# Patient Record
Sex: FEMALE | Race: WHITE | NOT HISPANIC OR LATINO | Employment: OTHER | URBAN - NONMETROPOLITAN AREA
[De-identification: names, ages, dates, MRNs, and addresses within clinical notes are randomized per-mention and may not be internally consistent; named-entity substitution may affect disease eponyms.]

---

## 2021-01-11 ENCOUNTER — IMMUNIZATION (OUTPATIENT)
Dept: VACCINE CLINIC | Facility: HOSPITAL | Age: 76
End: 2021-01-11

## 2021-01-11 PROCEDURE — 0001A: CPT | Performed by: FAMILY MEDICINE

## 2021-01-11 PROCEDURE — 91300 HC SARSCOV02 VAC 30MCG/0.3ML IM: CPT | Performed by: FAMILY MEDICINE

## 2021-02-01 ENCOUNTER — IMMUNIZATION (OUTPATIENT)
Dept: VACCINE CLINIC | Facility: HOSPITAL | Age: 76
End: 2021-02-01

## 2021-02-01 PROCEDURE — 0002A: CPT | Performed by: FAMILY MEDICINE

## 2021-02-01 PROCEDURE — 91300 HC SARSCOV02 VAC 30MCG/0.3ML IM: CPT | Performed by: FAMILY MEDICINE

## 2023-09-17 ENCOUNTER — APPOINTMENT (OUTPATIENT)
Dept: CT IMAGING | Facility: HOSPITAL | Age: 78
End: 2023-09-17
Payer: MEDICARE

## 2023-09-17 ENCOUNTER — HOSPITAL ENCOUNTER (EMERGENCY)
Facility: HOSPITAL | Age: 78
Discharge: HOME | End: 2023-09-17
Attending: EMERGENCY MEDICINE | Admitting: EMERGENCY MEDICINE
Payer: MEDICARE

## 2023-09-17 VITALS
RESPIRATION RATE: 18 BRPM | WEIGHT: 132 LBS | TEMPERATURE: 97.3 F | BODY MASS INDEX: 23.39 KG/M2 | HEART RATE: 72 BPM | HEIGHT: 63 IN | DIASTOLIC BLOOD PRESSURE: 72 MMHG | SYSTOLIC BLOOD PRESSURE: 160 MMHG | OXYGEN SATURATION: 91 %

## 2023-09-17 DIAGNOSIS — R91.1 NODULE OF LOWER LOBE OF RIGHT LUNG: ICD-10-CM

## 2023-09-17 DIAGNOSIS — S32.010A CLOSED COMPRESSION FRACTURE OF BODY OF L1 VERTEBRA: ICD-10-CM

## 2023-09-17 DIAGNOSIS — R10.10 UPPER ABDOMINAL PAIN: Primary | ICD-10-CM

## 2023-09-17 LAB
ALBUMIN SERPL-MCNC: 4.1 G/DL (ref 3.5–5.2)
ALBUMIN/GLOB SERPL: 1.2 G/DL
ALP SERPL-CCNC: 134 U/L (ref 39–117)
ALT SERPL W P-5'-P-CCNC: 16 U/L (ref 1–33)
ANION GAP SERPL CALCULATED.3IONS-SCNC: 13 MMOL/L (ref 5–15)
AST SERPL-CCNC: 19 U/L (ref 1–32)
BACTERIA UR QL AUTO: ABNORMAL /HPF
BASOPHILS # BLD AUTO: 0.06 10*3/MM3 (ref 0–0.2)
BASOPHILS NFR BLD AUTO: 0.6 % (ref 0–1.5)
BILIRUB SERPL-MCNC: 0.3 MG/DL (ref 0–1.2)
BILIRUB UR QL STRIP: NEGATIVE
BUN BLDA-MCNC: 18 MG/DL (ref 8–26)
BUN SERPL-MCNC: 18 MG/DL (ref 8–23)
BUN/CREAT SERPL: 18.4 (ref 7–25)
CA-I BLDA-SCNC: 1.24 MMOL/L (ref 1.2–1.32)
CALCIUM SPEC-SCNC: 9.6 MG/DL (ref 8.6–10.5)
CHLORIDE BLDA-SCNC: 105 MMOL/L (ref 98–109)
CHLORIDE SERPL-SCNC: 102 MMOL/L (ref 98–107)
CLARITY UR: CLEAR
CO2 BLDA-SCNC: 24 MMOL/L (ref 24–29)
CO2 SERPL-SCNC: 24 MMOL/L (ref 22–29)
COLOR UR: YELLOW
CREAT BLDA-MCNC: 0.9 MG/DL (ref 0.6–1.3)
CREAT SERPL-MCNC: 0.98 MG/DL (ref 0.57–1)
D-LACTATE SERPL-SCNC: 1.4 MMOL/L (ref 0.5–2)
DEPRECATED RDW RBC AUTO: 52 FL (ref 37–54)
EGFRCR SERPLBLD CKD-EPI 2021: 58.8 ML/MIN/1.73
EGFRCR SERPLBLD CKD-EPI 2021: 65.2 ML/MIN/1.73
EOSINOPHIL # BLD AUTO: 0.25 10*3/MM3 (ref 0–0.4)
EOSINOPHIL NFR BLD AUTO: 2.6 % (ref 0.3–6.2)
ERYTHROCYTE [DISTWIDTH] IN BLOOD BY AUTOMATED COUNT: 15.2 % (ref 12.3–15.4)
GLOBULIN UR ELPH-MCNC: 3.3 GM/DL
GLUCOSE BLDC GLUCOMTR-MCNC: 89 MG/DL (ref 70–130)
GLUCOSE SERPL-MCNC: 109 MG/DL (ref 65–99)
GLUCOSE UR STRIP-MCNC: NEGATIVE MG/DL
HCT VFR BLD AUTO: 36.1 % (ref 34–46.6)
HCT VFR BLDA CALC: 35 % (ref 38–51)
HGB BLD-MCNC: 11.4 G/DL (ref 12–15.9)
HGB BLDA-MCNC: 11.9 G/DL (ref 12–17)
HGB UR QL STRIP.AUTO: ABNORMAL
HOLD SPECIMEN: NORMAL
HYALINE CASTS UR QL AUTO: ABNORMAL /LPF
IMM GRANULOCYTES # BLD AUTO: 0.04 10*3/MM3 (ref 0–0.05)
IMM GRANULOCYTES NFR BLD AUTO: 0.4 % (ref 0–0.5)
KETONES UR QL STRIP: NEGATIVE
LEUKOCYTE ESTERASE UR QL STRIP.AUTO: ABNORMAL
LIPASE SERPL-CCNC: 52 U/L (ref 13–60)
LYMPHOCYTES # BLD AUTO: 1.88 10*3/MM3 (ref 0.7–3.1)
LYMPHOCYTES NFR BLD AUTO: 19.8 % (ref 19.6–45.3)
MCH RBC QN AUTO: 29 PG (ref 26.6–33)
MCHC RBC AUTO-ENTMCNC: 31.6 G/DL (ref 31.5–35.7)
MCV RBC AUTO: 91.9 FL (ref 79–97)
MONOCYTES # BLD AUTO: 0.7 10*3/MM3 (ref 0.1–0.9)
MONOCYTES NFR BLD AUTO: 7.4 % (ref 5–12)
NEUTROPHILS NFR BLD AUTO: 6.56 10*3/MM3 (ref 1.7–7)
NEUTROPHILS NFR BLD AUTO: 69.2 % (ref 42.7–76)
NITRITE UR QL STRIP: NEGATIVE
NRBC BLD AUTO-RTO: 0 /100 WBC (ref 0–0.2)
PH UR STRIP.AUTO: 5.5 [PH] (ref 5–8)
PLATELET # BLD AUTO: 497 10*3/MM3 (ref 140–450)
PMV BLD AUTO: 8.9 FL (ref 6–12)
POTASSIUM BLDA-SCNC: 4 MMOL/L (ref 3.5–4.9)
POTASSIUM SERPL-SCNC: 4.1 MMOL/L (ref 3.5–5.2)
PROT SERPL-MCNC: 7.4 G/DL (ref 6–8.5)
PROT UR QL STRIP: ABNORMAL
RBC # BLD AUTO: 3.93 10*6/MM3 (ref 3.77–5.28)
RBC # UR STRIP: ABNORMAL /HPF
REF LAB TEST METHOD: ABNORMAL
SODIUM BLD-SCNC: 139 MMOL/L (ref 138–146)
SODIUM SERPL-SCNC: 139 MMOL/L (ref 136–145)
SP GR UR STRIP: 1.02 (ref 1–1.03)
SQUAMOUS #/AREA URNS HPF: ABNORMAL /HPF
UROBILINOGEN UR QL STRIP: ABNORMAL
WBC # UR STRIP: ABNORMAL /HPF
WBC NRBC COR # BLD: 9.49 10*3/MM3 (ref 3.4–10.8)
WHOLE BLOOD HOLD COAG: NORMAL
WHOLE BLOOD HOLD SPECIMEN: NORMAL

## 2023-09-17 PROCEDURE — 81001 URINALYSIS AUTO W/SCOPE: CPT | Performed by: EMERGENCY MEDICINE

## 2023-09-17 PROCEDURE — 85025 COMPLETE CBC W/AUTO DIFF WBC: CPT | Performed by: EMERGENCY MEDICINE

## 2023-09-17 PROCEDURE — 99285 EMERGENCY DEPT VISIT HI MDM: CPT

## 2023-09-17 PROCEDURE — 80053 COMPREHEN METABOLIC PANEL: CPT | Performed by: EMERGENCY MEDICINE

## 2023-09-17 PROCEDURE — 83605 ASSAY OF LACTIC ACID: CPT | Performed by: EMERGENCY MEDICINE

## 2023-09-17 PROCEDURE — 83690 ASSAY OF LIPASE: CPT | Performed by: EMERGENCY MEDICINE

## 2023-09-17 PROCEDURE — 96375 TX/PRO/DX INJ NEW DRUG ADDON: CPT

## 2023-09-17 PROCEDURE — 85014 HEMATOCRIT: CPT

## 2023-09-17 PROCEDURE — 25010000002 ONDANSETRON PER 1 MG: Performed by: PHYSICIAN ASSISTANT

## 2023-09-17 PROCEDURE — 74177 CT ABD & PELVIS W/CONTRAST: CPT

## 2023-09-17 PROCEDURE — 25510000001 IOPAMIDOL PER 1 ML: Performed by: EMERGENCY MEDICINE

## 2023-09-17 PROCEDURE — 80047 BASIC METABLC PNL IONIZED CA: CPT

## 2023-09-17 PROCEDURE — 96374 THER/PROPH/DIAG INJ IV PUSH: CPT

## 2023-09-17 PROCEDURE — 71275 CT ANGIOGRAPHY CHEST: CPT

## 2023-09-17 PROCEDURE — 25010000002 HYDROMORPHONE PER 4 MG: Performed by: EMERGENCY MEDICINE

## 2023-09-17 RX ORDER — IBUPROFEN 800 MG/1
1 TABLET ORAL
COMMUNITY
Start: 2023-09-12 | End: 2023-09-21 | Stop reason: HOSPADM

## 2023-09-17 RX ORDER — SODIUM CHLORIDE 9 MG/ML
10 INJECTION INTRAVENOUS AS NEEDED
Status: DISCONTINUED | OUTPATIENT
Start: 2023-09-17 | End: 2023-09-17 | Stop reason: HOSPADM

## 2023-09-17 RX ORDER — ONDANSETRON 2 MG/ML
4 INJECTION INTRAMUSCULAR; INTRAVENOUS ONCE
Status: COMPLETED | OUTPATIENT
Start: 2023-09-17 | End: 2023-09-17

## 2023-09-17 RX ORDER — VENLAFAXINE HYDROCHLORIDE 150 MG/1
1 CAPSULE, EXTENDED RELEASE ORAL EVERY 24 HOURS
COMMUNITY
Start: 2023-08-02

## 2023-09-17 RX ORDER — PANTOPRAZOLE SODIUM 40 MG/1
40 TABLET, DELAYED RELEASE ORAL DAILY
Qty: 14 TABLET | Refills: 0 | Status: SHIPPED | OUTPATIENT
Start: 2023-09-17 | End: 2023-09-18 | Stop reason: SDUPTHER

## 2023-09-17 RX ORDER — ONDANSETRON 4 MG/1
4 TABLET, ORALLY DISINTEGRATING ORAL EVERY 6 HOURS PRN
Qty: 12 TABLET | Refills: 0 | Status: SHIPPED | OUTPATIENT
Start: 2023-09-17

## 2023-09-17 RX ORDER — HYDROCODONE BITARTRATE AND ACETAMINOPHEN 5; 325 MG/1; MG/1
1 TABLET ORAL EVERY 6 HOURS PRN
Qty: 10 TABLET | Refills: 0 | Status: SHIPPED | OUTPATIENT
Start: 2023-09-17

## 2023-09-17 RX ORDER — HYDROMORPHONE HYDROCHLORIDE 1 MG/ML
0.25 INJECTION, SOLUTION INTRAMUSCULAR; INTRAVENOUS; SUBCUTANEOUS ONCE
Status: COMPLETED | OUTPATIENT
Start: 2023-09-17 | End: 2023-09-17

## 2023-09-17 RX ADMIN — IOPAMIDOL 85 ML: 755 INJECTION, SOLUTION INTRAVENOUS at 08:40

## 2023-09-17 RX ADMIN — ONDANSETRON 4 MG: 2 INJECTION INTRAMUSCULAR; INTRAVENOUS at 08:18

## 2023-09-17 RX ADMIN — HYDROMORPHONE HYDROCHLORIDE 0.25 MG: 1 INJECTION, SOLUTION INTRAMUSCULAR; INTRAVENOUS; SUBCUTANEOUS at 08:18

## 2023-09-17 NOTE — ED PROVIDER NOTES
Subjective   History of Present Illness  Ms. Avalos is a generally healthy 77-year-old female (history of depression and hyperlipidemia only), who presents to the emergency department with right lower abdominal and right flank pain for at least a month with radiation of the pain into the back and into the left shoulder blade.  The patient had a recent episode of nausea and vomiting 2 days ago.  She states she has had normal bowel movements.  Normal urination.  No fever or chills.  The patient states that she was seen at [facility] in [place] last month for the same complaints and had a CT of the abdomen pelvis that showed a small right-sided kidney stone.  Her symptoms persist.  The pain is somewhat worse with movement.  She denies any chest pain or shortness of breath.  No cough.  No prior abdominal surgeries.    Review of Systems   Constitutional:  Negative for chills and fever.   HENT:  Negative for sore throat.    Respiratory:  Negative for cough and shortness of breath.    Cardiovascular:  Negative for chest pain.   Gastrointestinal:  Positive for abdominal pain (Right lower). Negative for blood in stool, constipation and diarrhea.        Nausea and vomiting 2 days ago but now resolved.   Genitourinary:  Negative for dysuria.   Musculoskeletal:  Positive for back pain (Low back pain with radiation under the left scapular region.).   Skin:  Negative for rash.   Neurological:  Negative for numbness and headaches.   Hematological: Negative.    Psychiatric/Behavioral: Negative.       Past Medical History:   Diagnosis Date    Depression     Hyperlipidemia        No Known Allergies    History reviewed. No pertinent surgical history.    History reviewed. No pertinent family history.    Social History     Socioeconomic History    Marital status:    Tobacco Use    Smoking status: Every Day     Types: Cigarettes   Substance and Sexual Activity    Alcohol use: Never    Drug use: Never    Sexual activity:  Defer           Objective   Physical Exam  Constitutional:       General: She is not in acute distress.     Appearance: Normal appearance. She is not ill-appearing or diaphoretic.   HENT:      Head: Normocephalic and atraumatic.      Nose: Nose normal.      Mouth/Throat:      Mouth: Mucous membranes are moist.   Eyes:      General: No scleral icterus.     Extraocular Movements: Extraocular movements intact.      Conjunctiva/sclera: Conjunctivae normal.      Pupils: Pupils are equal, round, and reactive to light.   Cardiovascular:      Rate and Rhythm: Normal rate and regular rhythm.      Pulses: Normal pulses.      Heart sounds: Normal heart sounds.      Comments: Normal pedal pulses and radial pulses bilaterally.  Pulmonary:      Effort: Pulmonary effort is normal.      Breath sounds: Normal breath sounds.   Abdominal:      Tenderness: There is abdominal tenderness (Moderate tenderness on palpation over the right lower abdomen (just inferior to McBurney's point).  No guarding or rebound.).   Musculoskeletal:         General: No tenderness.      Cervical back: Normal range of motion and neck supple.      Right lower leg: No edema.      Left lower leg: No edema.   Skin:     General: Skin is warm and dry.   Neurological:      General: No focal deficit present.      Mental Status: She is alert and oriented to person, place, and time.   Psychiatric:         Mood and Affect: Mood normal.       Procedures           ED Course      In summary, 77-year-old female presents with a 1 month history of right lower quadrant abdominal pain and right flank pain.  The pain radiates into the lower back and up under the left shoulder blade.  She had an episode of nausea and vomiting 2 days ago but that has now resolved.  She continues to have pain and notes that it is some worse with movement.  Patient was seen at [facility] last month and had a CT scan of the abdomen pelvis that showed a small right kidney stone.    MDM:  Differential includes urinary tract infection, kidney stone, appendicitis, musculoskeletal pain, aneurysmal pain, radiculopathy, etc.    Old records reviewed.  Urinalysis and labs obtained.  Patient be sent for CT abdomen pelvis with IV contrast.  Given that the pain radiates into the left subscapular region, I will also get CT chest with contrast.  Patient will be given small dose of Dilaudid 0.25 mg IV and a dose of Zofran 4 mg IV.    White count is normal at 9.49.  No significant anemia.    Glucose is 109.  Normal chemistries.  Normal LFTs and creatinine.    UA shows 7-12 RBC, 13-20 WBC and 2+bacteria.  Unfortunately, there are also 13-20 epithelial cells suggesting contamination.  She has no urinary symptoms.  Will culture urine but not treat at this time.    CTA chest and CT abd/pelvis:  1. No evidence of pulmonary embolism. No evidence of aortic aneurysm or dissection     2. A spiculated pulmonary nodule in the superior segment of the right lower lobe highly suspicious for neoplasm     Indeterminate solid pulmonary nodule measuring 15 mm. In a patient of unknown risk level with a solid nodule >8 mm, consider PET/CT or tissue sampling, vs. CT at 3 months.     3. Emphysema     4. No acute CT abnormalities in the abdomen or pelvis     5. Age-indeterminate compression fracture deformity of L1. Please correlate clinically      10:35 EDT  I spoke with the pt about her workup.  I don't have a good explanation for her abdominal pain.  Repeat exam now has her more tender in the RUQ and epigastrium than on prior exam, which was more RLQ tenderness at that time.  We discussed various possibilities including cholestasis, PUD, etc.  I will plan to start her on a PPI and have her f/u with GI.  Regarding her right lung nodule, I spoke with her at length about it and the need for further evaluation with PET scan or follow up CT.  She is a smoker (1/2 ppd).  She states she has no PCP, no pulmonologist.  She has also never had  colonoscopy or EGD.  I will refer her to pulmonology and lung nodule clinic and will also refer her to GI.  She may warrant a HIDA scan as well as EGD, colonoscopy, etc.      I also discussed the incidental L1 compression fracture.  I palpated over her back fairly aggressively and she has no tenderness on palpation.  This is likely an old fracture.                                               Medical Decision Making  Amount and/or Complexity of Data Reviewed  Labs: ordered.  Radiology: ordered.    Risk  Prescription drug management.        Final diagnoses:   Upper abdominal pain   Nodule of lower lobe of right lung   Closed compression fracture of body of L1 vertebra       ED Disposition  ED Disposition       ED Disposition   Discharge    Condition   Stable    Comment   --               Melissa Gerber MD  [address]  [place] [address]  [facility] [address]  [phone]      call for follow up    Zia Win MD  [address] [id number] [phone]      call tomorrow for follow up for emphysema and further evaluation of spiculated right lung nodule    Timothy Funes MD  [address] [facility] [address]  [place] [id number] [phone]      call tomorrow for follow up for abdominal pain    [facility]  [address] [facility] [address] [phone]        [facility] EMERGENCY DEPARTMENT  [address] [facility] [id number] [phone]    If symptoms worsen         Medication List        New Prescriptions      ondansetron ODT 4 MG disintegrating tablet  Commonly known as: ZOFRAN-ODT  Place 1 tablet on the tongue Every 6 (Six) Hours As Needed for Nausea.     pantoprazole 40 MG EC tablet  Commonly known as: PROTONIX  Take 1 tablet by mouth Daily.               Where to Get Your Medications        These medications were sent to [facility][id number] - [address] AT [facility] [address] - [phone]  - [phone] FX  [address] [facility] [address], [facility] [address]      Phone: [phone]   ondansetron ODT 4 MG disintegrating tablet  pantoprazole 40 MG EC tablet            Ambrose Waddell, PA  09/17/23 1030

## 2023-09-17 NOTE — DISCHARGE INSTRUCTIONS
Follow up with lung nodule clinic and with Dr. Colin (lung specialist) for further evaluation of right lung nodule.    Call Dr. Funes (gastroenterologist) for follow up of abdominal pain.   Meds as prescribed.  Return to the ER if worse.

## 2023-09-18 ENCOUNTER — OFFICE VISIT (OUTPATIENT)
Dept: GASTROENTEROLOGY | Facility: CLINIC | Age: 78
End: 2023-09-18
Payer: MEDICARE

## 2023-09-18 VITALS
BODY MASS INDEX: 22.68 KG/M2 | WEIGHT: 128 LBS | DIASTOLIC BLOOD PRESSURE: 55 MMHG | SYSTOLIC BLOOD PRESSURE: 96 MMHG | HEART RATE: 94 BPM | HEIGHT: 63 IN

## 2023-09-18 DIAGNOSIS — R91.1 LUNG NODULE: ICD-10-CM

## 2023-09-18 DIAGNOSIS — K21.9 GASTROESOPHAGEAL REFLUX DISEASE, UNSPECIFIED WHETHER ESOPHAGITIS PRESENT: ICD-10-CM

## 2023-09-18 DIAGNOSIS — R10.31 RLQ ABDOMINAL PAIN: Primary | ICD-10-CM

## 2023-09-18 DIAGNOSIS — Z12.11 ENCOUNTER FOR SCREENING FOR MALIGNANT NEOPLASM OF COLON: ICD-10-CM

## 2023-09-18 DIAGNOSIS — R11.2 NAUSEA AND VOMITING, UNSPECIFIED VOMITING TYPE: ICD-10-CM

## 2023-09-18 DIAGNOSIS — R10.13 EPIGASTRIC PAIN: ICD-10-CM

## 2023-09-18 DIAGNOSIS — R63.4 WEIGHT LOSS: ICD-10-CM

## 2023-09-18 PROCEDURE — 1160F RVW MEDS BY RX/DR IN RCRD: CPT | Performed by: NURSE PRACTITIONER

## 2023-09-18 PROCEDURE — 1159F MED LIST DOCD IN RCRD: CPT | Performed by: NURSE PRACTITIONER

## 2023-09-18 PROCEDURE — 99204 OFFICE O/P NEW MOD 45 MIN: CPT | Performed by: NURSE PRACTITIONER

## 2023-09-18 RX ORDER — POLYETHYLENE GLYCOL 3350 17 G/17G
510 POWDER, FOR SOLUTION ORAL TAKE AS DIRECTED
Qty: 510 G | Refills: 0 | Status: SHIPPED | OUTPATIENT
Start: 2023-09-18 | End: 2023-09-21 | Stop reason: HOSPADM

## 2023-09-18 RX ORDER — BISACODYL 5 MG/1
20 TABLET, DELAYED RELEASE ORAL ONCE
Qty: 4 TABLET | Refills: 0 | Status: SHIPPED | OUTPATIENT
Start: 2023-09-18 | End: 2023-09-18

## 2023-09-18 RX ORDER — SUCRALFATE ORAL 1 G/10ML
1 SUSPENSION ORAL 4 TIMES DAILY
Qty: 414 ML | Refills: 0 | Status: SHIPPED | OUTPATIENT
Start: 2023-09-18

## 2023-09-18 RX ORDER — PANTOPRAZOLE SODIUM 40 MG/1
40 TABLET, DELAYED RELEASE ORAL DAILY
Qty: 30 TABLET | Refills: 3 | Status: SHIPPED | OUTPATIENT
Start: 2023-09-18

## 2023-09-18 NOTE — PROGRESS NOTES
DATE OF CONSULTATION:  9/18/2023    REASON FOR REFERRAL: Abdominal pain    CHIEF COMPLAINT:  Epigastric/RUQ pain, RLQ pain, nausea and vomiting, GERD, weight loss    HISTORY OF PRESENT ILLNESS:   America Avalos is a very pleasant 77 y.o. female who is being seen today for evaluation and treatment of abdominal pain. For the past ~1 month, Ms. Avalos reports she has been feeling poorly. She complains of abdominal pain (epigastric and RUQ particularly after eating and intermittent RLQ abdominal pain), nausea and vomiting and weight loss ( ~7 lbs in the past one month). She presented to [facility] ED on 9/17/23 and underwent CT A/P which showed no acute findings in the abdomen or pelvis. However, she did have a spiculated pulmonary nodule in the superior segment of the right lower lobe highly suspicious for neoplasm. Per ED note, plan is to refer her to pulmonology/lung nodule clinic for further evaluation. However, Ms. Avalos says she is currently more concerned with evaluating her abdominal pain. She was given Rx for Protonix 40 mg PO daily which she started yesterday. She reports daily flares of GERD with burning in her chest and throat along with frequent belching. She says spicy, greasy and acidic foods make her symptoms worse which she avoids. She reports having increased stress recently and has been taking ibuprofen  800 mg TID for headaches and abdominal pain. She retains her gallbladder. Denies dysphagia. She complains of abdominal bloating which appears to be worsening today. She reports her bowels are moving every other day with stool type 4 on BSS. She denies having melena. Denies BRBPR. She has never had a colonoscopy. Denies family history of colon cancer. She has no other complaints today.     PAST MEDICAL HISTORY:  Past Medical History:   Diagnosis Date    Depression     Hyperlipidemia        PAST SURGICAL HISTORY:  History reviewed. No pertinent surgical history.    FAMILY HISTORY:  History reviewed. No  "pertinent family history.    SOCIAL HISTORY:  Social History     Socioeconomic History    Marital status:    Tobacco Use    Smoking status: Every Day     Types: Cigarettes   Substance and Sexual Activity    Alcohol use: Never    Drug use: Never    Sexual activity: Defer     MEDICATIONS:  The current medication list was reviewed in the EMR    Current Outpatient Medications:     HYDROcodone-acetaminophen (NORCO) 5-325 MG per tablet, Take 1 tablet by mouth Every 6 (Six) Hours As Needed for Moderate Pain., Disp: 10 tablet, Rfl: 0    ibuprofen (ADVIL,MOTRIN) 800 MG tablet, Take 1 tablet by mouth 3 (Three) Times a Day With Meals., Disp: , Rfl:     ondansetron ODT (ZOFRAN-ODT) 4 MG disintegrating tablet, Place 1 tablet on the tongue Every 6 (Six) Hours As Needed for Nausea., Disp: 12 tablet, Rfl: 0    pantoprazole (PROTONIX) 40 MG EC tablet, Take 1 tablet by mouth Daily., Disp: 14 tablet, Rfl: 0    venlafaxine XR (EFFEXOR-XR) 150 MG 24 hr capsule, Take 1 capsule by mouth Daily., Disp: , Rfl:     ALLERGIES:  No Known Allergies      REVIEW OF SYSTEMS:    A comprehensive 14 point review of systems was performed.  Significant findings as mentioned above.  All other systems reviewed and are negative.        Physical Exam   Vital Signs: BP 96/55 (BP Location: Left arm, Patient Position: Sitting, Cuff Size: Large Adult)   Pulse 94   Ht 160 cm (63\")   Wt 58.1 kg (128 lb)   BMI 22.67 kg/m²    General: Well developed, well nourished, alert and oriented x 3, in no acute distress.   Head: ATNC   Eyes: PERRL, No evidence of conjunctivitis.   Nose: No nasal discharge.   Mouth: Oral mucosal membranes moist. No oral ulceration or hemorrhages.   Neck: Neck supple. No thyromegaly. No JVD.   Lungs: Clear in all fields to A&P without rales, rhonchi or wheezing.   Heart: Regular rate and rhythm. No murmurs, rubs, or gallops.   Abdomen: Soft. Bowel sounds are normoactive. Nontender with palpation.   Extremities: No cyanosis or edema. "   Neurologic: Grossly non-focal exam     IMAGING:   CT Abdomen Pelvis With Contrast    Result Date: 9/17/2023  Impression: 1. No evidence of pulmonary embolism. No evidence of aortic aneurysm or dissection 2. A spiculated pulmonary nodule in the superior segment of the right lower lobe highly suspicious for neoplasm Indeterminate solid pulmonary nodule measuring 15 mm. In a patient of unknown risk level with a solid nodule >8 mm, consider PET/CT or tissue sampling, vs. CT at 3 months. 3. Emphysema 4. No acute CT abnormalities in the abdomen or pelvis 5. Age-indeterminate compression fracture deformity of L1. Please correlate clinically Electronically Signed: Hasmukh Rodriguez MD  9/17/2023 9:20 AM EDT  Workstation ID: [id number]    CT Abdomen Pelvis With Contrast    Result Date: 8/22/2023  Tiny nonobstructing right renal calculus. Otherwise, unremarkable. Images reviewed, interpreted and dictated by Dr. Castro Salter MD    CT Angiogram Chest    Result Date: 9/17/2023  Impression: 1. No evidence of pulmonary embolism. No evidence of aortic aneurysm or dissection 2. A spiculated pulmonary nodule in the superior segment of the right lower lobe highly suspicious for neoplasm Indeterminate solid pulmonary nodule measuring 15 mm. In a patient of unknown risk level with a solid nodule >8 mm, consider PET/CT or tissue sampling, vs. CT at 3 months. 3. Emphysema 4. No acute CT abnormalities in the abdomen or pelvis 5. Age-indeterminate compression fracture deformity of L1. Please correlate clinically Electronically Signed: Hasmukh Rodriguez MD  9/17/2023 9:20 AM EDT  Workstation ID: [id number]        RECENT LABS:  Lab Results   Component Value Date    WBC 9.49 09/17/2023    HGB 11.9 (L) 09/17/2023    HCT 35 (L) 09/17/2023    MCV 91.9 09/17/2023    RDW 15.2 09/17/2023     (H) 09/17/2023    NEUTRORELPCT 69.2 09/17/2023    LYMPHORELPCT 19.8 09/17/2023    MONORELPCT 7.4 09/17/2023    EOSRELPCT 2.6 09/17/2023    BASORELPCT 0.6  2023    NEUTROABS 6.56 2023    LYMPHSABS 1.88 2023       Lab Results   Component Value Date     2023    K 4.1 2023    CO2 24.0 2023     2023    BUN 18 2023    CREATININE 0.90 2023    GLUCOSE 109 (H) 2023    CALCIUM 9.6 2023    ALKPHOS 134 (H) 2023    AST 19 2023    ALT 16 2023    BILITOT 0.3 2023    ALBUMIN 4.1 2023    PROTEINTOT 7.4 2023       ASSESSMENT & PLAN:  America Avalos is a very pleasant 77 y.o. female with    1.  GERD/Epigastric pain:  2. RUQ pain:  3. RLQ pain:  4. Nausea and vomitin. Weight loss:    -Recommended EGD/colonoscopy to evaluate the above issues. After discussing the risks/benefits, patient was agreeable. Will schedule as soon as possible. In the meantime, she will continue Protonix 40 mg PO daily which was started yesterday in the ED. Refill provided. Will also give Rx for Carafate four times daily. She was advised to stop taking ibuprofen and take tylenol if needed. She was also given Rx for zofran in ED which she can continue to take as needed for n/v. Will have her RTC following the above procedures.     6. RLL lung nodule:  -Patient was referred to pulmonology/lung nodule clinic by the ED. Strongly recommended she keep this appointment. We are also planning for EGD/colonoscopy as above.       The patient was in agreement with the plan and all questions were answered to her satisfaction.     Thank you so much for allowing us to participate in the care of America Avalos . Please do not hesitate to contact us with any questions or concerns.             Electronically Signed by: BUSHRA Lewis , 2023 14:00 EDT       CC:   Melissa Gerber MD

## 2023-09-18 NOTE — H&P (VIEW-ONLY)
DATE OF CONSULTATION:  9/18/2023    REASON FOR REFERRAL: Abdominal pain    CHIEF COMPLAINT:  Epigastric/RUQ pain, RLQ pain, nausea and vomiting, GERD, weight loss    HISTORY OF PRESENT ILLNESS:   America Avalos is a very pleasant 77 y.o. female who is being seen today for evaluation and treatment of abdominal pain. For the past ~1 month, Ms. Avalos reports she has been feeling poorly. She complains of abdominal pain (epigastric and RUQ particularly after eating and intermittent RLQ abdominal pain), nausea and vomiting and weight loss ( ~7 lbs in the past one month). She presented to [facility] ED on 9/17/23 and underwent CT A/P which showed no acute findings in the abdomen or pelvis. However, she did have a spiculated pulmonary nodule in the superior segment of the right lower lobe highly suspicious for neoplasm. Per ED note, plan is to refer her to pulmonology/lung nodule clinic for further evaluation. However, Ms. Avalos says she is currently more concerned with evaluating her abdominal pain. She was given Rx for Protonix 40 mg PO daily which she started yesterday. She reports daily flares of GERD with burning in her chest and throat along with frequent belching. She says spicy, greasy and acidic foods make her symptoms worse which she avoids. She reports having increased stress recently and has been taking ibuprofen  800 mg TID for headaches and abdominal pain. She retains her gallbladder. Denies dysphagia. She complains of abdominal bloating which appears to be worsening today. She reports her bowels are moving every other day with stool type 4 on BSS. She denies having melena. Denies BRBPR. She has never had a colonoscopy. Denies family history of colon cancer. She has no other complaints today.     PAST MEDICAL HISTORY:  Past Medical History:   Diagnosis Date    Depression     Hyperlipidemia        PAST SURGICAL HISTORY:  History reviewed. No pertinent surgical history.    FAMILY HISTORY:  History reviewed. No  "pertinent family history.    SOCIAL HISTORY:  Social History     Socioeconomic History    Marital status:    Tobacco Use    Smoking status: Every Day     Types: Cigarettes   Substance and Sexual Activity    Alcohol use: Never    Drug use: Never    Sexual activity: Defer     MEDICATIONS:  The current medication list was reviewed in the EMR    Current Outpatient Medications:     HYDROcodone-acetaminophen (NORCO) 5-325 MG per tablet, Take 1 tablet by mouth Every 6 (Six) Hours As Needed for Moderate Pain., Disp: 10 tablet, Rfl: 0    ibuprofen (ADVIL,MOTRIN) 800 MG tablet, Take 1 tablet by mouth 3 (Three) Times a Day With Meals., Disp: , Rfl:     ondansetron ODT (ZOFRAN-ODT) 4 MG disintegrating tablet, Place 1 tablet on the tongue Every 6 (Six) Hours As Needed for Nausea., Disp: 12 tablet, Rfl: 0    pantoprazole (PROTONIX) 40 MG EC tablet, Take 1 tablet by mouth Daily., Disp: 14 tablet, Rfl: 0    venlafaxine XR (EFFEXOR-XR) 150 MG 24 hr capsule, Take 1 capsule by mouth Daily., Disp: , Rfl:     ALLERGIES:  No Known Allergies      REVIEW OF SYSTEMS:    A comprehensive 14 point review of systems was performed.  Significant findings as mentioned above.  All other systems reviewed and are negative.        Physical Exam   Vital Signs: BP 96/55 (BP Location: Left arm, Patient Position: Sitting, Cuff Size: Large Adult)   Pulse 94   Ht 160 cm (63\")   Wt 58.1 kg (128 lb)   BMI 22.67 kg/m²    General: Well developed, well nourished, alert and oriented x 3, in no acute distress.   Head: ATNC   Eyes: PERRL, No evidence of conjunctivitis.   Nose: No nasal discharge.   Mouth: Oral mucosal membranes moist. No oral ulceration or hemorrhages.   Neck: Neck supple. No thyromegaly. No JVD.   Lungs: Clear in all fields to A&P without rales, rhonchi or wheezing.   Heart: Regular rate and rhythm. No murmurs, rubs, or gallops.   Abdomen: Soft. Bowel sounds are normoactive. Nontender with palpation.   Extremities: No cyanosis or edema. "   Neurologic: Grossly non-focal exam     IMAGING:   CT Abdomen Pelvis With Contrast    Result Date: 9/17/2023  Impression: 1. No evidence of pulmonary embolism. No evidence of aortic aneurysm or dissection 2. A spiculated pulmonary nodule in the superior segment of the right lower lobe highly suspicious for neoplasm Indeterminate solid pulmonary nodule measuring 15 mm. In a patient of unknown risk level with a solid nodule >8 mm, consider PET/CT or tissue sampling, vs. CT at 3 months. 3. Emphysema 4. No acute CT abnormalities in the abdomen or pelvis 5. Age-indeterminate compression fracture deformity of L1. Please correlate clinically Electronically Signed: Hasmukh Rodriguez MD  9/17/2023 9:20 AM EDT  Workstation ID: [id number]    CT Abdomen Pelvis With Contrast    Result Date: 8/22/2023  Tiny nonobstructing right renal calculus. Otherwise, unremarkable. Images reviewed, interpreted and dictated by Dr. Csatro Salter MD    CT Angiogram Chest    Result Date: 9/17/2023  Impression: 1. No evidence of pulmonary embolism. No evidence of aortic aneurysm or dissection 2. A spiculated pulmonary nodule in the superior segment of the right lower lobe highly suspicious for neoplasm Indeterminate solid pulmonary nodule measuring 15 mm. In a patient of unknown risk level with a solid nodule >8 mm, consider PET/CT or tissue sampling, vs. CT at 3 months. 3. Emphysema 4. No acute CT abnormalities in the abdomen or pelvis 5. Age-indeterminate compression fracture deformity of L1. Please correlate clinically Electronically Signed: Hasmukh Rodriguez MD  9/17/2023 9:20 AM EDT  Workstation ID: [id number]        RECENT LABS:  Lab Results   Component Value Date    WBC 9.49 09/17/2023    HGB 11.9 (L) 09/17/2023    HCT 35 (L) 09/17/2023    MCV 91.9 09/17/2023    RDW 15.2 09/17/2023     (H) 09/17/2023    NEUTRORELPCT 69.2 09/17/2023    LYMPHORELPCT 19.8 09/17/2023    MONORELPCT 7.4 09/17/2023    EOSRELPCT 2.6 09/17/2023    BASORELPCT 0.6  2023    NEUTROABS 6.56 2023    LYMPHSABS 1.88 2023       Lab Results   Component Value Date     2023    K 4.1 2023    CO2 24.0 2023     2023    BUN 18 2023    CREATININE 0.90 2023    GLUCOSE 109 (H) 2023    CALCIUM 9.6 2023    ALKPHOS 134 (H) 2023    AST 19 2023    ALT 16 2023    BILITOT 0.3 2023    ALBUMIN 4.1 2023    PROTEINTOT 7.4 2023       ASSESSMENT & PLAN:  America Avalos is a very pleasant 77 y.o. female with    1.  GERD/Epigastric pain:  2. RUQ pain:  3. RLQ pain:  4. Nausea and vomitin. Weight loss:    -Recommended EGD/colonoscopy to evaluate the above issues. After discussing the risks/benefits, patient was agreeable. Will schedule as soon as possible. In the meantime, she will continue Protonix 40 mg PO daily which was started yesterday in the ED. Refill provided. Will also give Rx for Carafate four times daily. She was advised to stop taking ibuprofen and take tylenol if needed. She was also given Rx for zofran in ED which she can continue to take as needed for n/v. Will have her RTC following the above procedures.     6. RLL lung nodule:  -Patient was referred to pulmonology/lung nodule clinic by the ED. Strongly recommended she keep this appointment. We are also planning for EGD/colonoscopy as above.       The patient was in agreement with the plan and all questions were answered to her satisfaction.     Thank you so much for allowing us to participate in the care of America Avalos . Please do not hesitate to contact us with any questions or concerns.             Electronically Signed by: BUSHRA Lewis , 2023 14:00 EDT       CC:   Melissa Gerber MD

## 2023-09-21 ENCOUNTER — HOSPITAL ENCOUNTER (OUTPATIENT)
Facility: HOSPITAL | Age: 78
Setting detail: HOSPITAL OUTPATIENT SURGERY
Discharge: HOME | End: 2023-09-21
Attending: INTERNAL MEDICINE | Admitting: INTERNAL MEDICINE
Payer: MEDICARE

## 2023-09-21 ENCOUNTER — ANESTHESIA EVENT (OUTPATIENT)
Dept: PERIOP | Facility: HOSPITAL | Age: 78
End: 2023-09-21
Payer: MEDICARE

## 2023-09-21 ENCOUNTER — ANESTHESIA (OUTPATIENT)
Dept: PERIOP | Facility: HOSPITAL | Age: 78
End: 2023-09-21
Payer: MEDICARE

## 2023-09-21 VITALS
OXYGEN SATURATION: 98 % | RESPIRATION RATE: 18 BRPM | TEMPERATURE: 97 F | WEIGHT: 128 LBS | HEIGHT: 64 IN | DIASTOLIC BLOOD PRESSURE: 67 MMHG | BODY MASS INDEX: 21.85 KG/M2 | SYSTOLIC BLOOD PRESSURE: 137 MMHG | HEART RATE: 72 BPM

## 2023-09-21 DIAGNOSIS — D64.9 ANEMIA, UNSPECIFIED TYPE: Primary | ICD-10-CM

## 2023-09-21 DIAGNOSIS — R63.4 WEIGHT LOSS: ICD-10-CM

## 2023-09-21 DIAGNOSIS — Z12.11 ENCOUNTER FOR SCREENING FOR MALIGNANT NEOPLASM OF COLON: ICD-10-CM

## 2023-09-21 DIAGNOSIS — R11.2 NAUSEA AND VOMITING, UNSPECIFIED VOMITING TYPE: ICD-10-CM

## 2023-09-21 DIAGNOSIS — R10.31 RLQ ABDOMINAL PAIN: ICD-10-CM

## 2023-09-21 DIAGNOSIS — D50.0 IRON DEFICIENCY ANEMIA DUE TO CHRONIC BLOOD LOSS: ICD-10-CM

## 2023-09-21 DIAGNOSIS — R10.13 EPIGASTRIC PAIN: ICD-10-CM

## 2023-09-21 DIAGNOSIS — K21.9 GASTROESOPHAGEAL REFLUX DISEASE, UNSPECIFIED WHETHER ESOPHAGITIS PRESENT: ICD-10-CM

## 2023-09-21 LAB
BASOPHILS # BLD AUTO: 0.03 10*3/MM3 (ref 0–0.2)
BASOPHILS NFR BLD AUTO: 0.4 % (ref 0–1.5)
DEPRECATED RDW RBC AUTO: 54.3 FL (ref 37–54)
EOSINOPHIL # BLD AUTO: 0.06 10*3/MM3 (ref 0–0.4)
EOSINOPHIL NFR BLD AUTO: 0.8 % (ref 0.3–6.2)
ERYTHROCYTE [DISTWIDTH] IN BLOOD BY AUTOMATED COUNT: 15.3 % (ref 12.3–15.4)
FERRITIN SERPL-MCNC: 121.3 NG/ML (ref 13–150)
HCT VFR BLD AUTO: 26.6 % (ref 34–46.6)
HGB BLD-MCNC: 7.8 G/DL (ref 12–15.9)
IMM GRANULOCYTES # BLD AUTO: 0.02 10*3/MM3 (ref 0–0.05)
IMM GRANULOCYTES NFR BLD AUTO: 0.3 % (ref 0–0.5)
IRON 24H UR-MRATE: 49 MCG/DL (ref 37–145)
IRON SATN MFR SERPL: 15 % (ref 20–50)
LYMPHOCYTES # BLD AUTO: 1.56 10*3/MM3 (ref 0.7–3.1)
LYMPHOCYTES NFR BLD AUTO: 20.2 % (ref 19.6–45.3)
MCH RBC QN AUTO: 28.3 PG (ref 26.6–33)
MCHC RBC AUTO-ENTMCNC: 29.3 G/DL (ref 31.5–35.7)
MCV RBC AUTO: 96.4 FL (ref 79–97)
MONOCYTES # BLD AUTO: 0.52 10*3/MM3 (ref 0.1–0.9)
MONOCYTES NFR BLD AUTO: 6.7 % (ref 5–12)
NEUTROPHILS NFR BLD AUTO: 5.54 10*3/MM3 (ref 1.7–7)
NEUTROPHILS NFR BLD AUTO: 71.6 % (ref 42.7–76)
NRBC BLD AUTO-RTO: 0 /100 WBC (ref 0–0.2)
PLATELET # BLD AUTO: 393 10*3/MM3 (ref 140–450)
PMV BLD AUTO: 9.2 FL (ref 6–12)
RBC # BLD AUTO: 2.76 10*6/MM3 (ref 3.77–5.28)
TIBC SERPL-MCNC: 332 MCG/DL (ref 298–536)
TRANSFERRIN SERPL-MCNC: 223 MG/DL (ref 200–360)
WBC NRBC COR # BLD: 7.73 10*3/MM3 (ref 3.4–10.8)

## 2023-09-21 PROCEDURE — 88305 TISSUE EXAM BY PATHOLOGIST: CPT

## 2023-09-21 PROCEDURE — 83540 ASSAY OF IRON: CPT | Performed by: NURSE PRACTITIONER

## 2023-09-21 PROCEDURE — 45378 DIAGNOSTIC COLONOSCOPY: CPT | Performed by: INTERNAL MEDICINE

## 2023-09-21 PROCEDURE — 25010000002 PROPOFOL 200 MG/20ML EMULSION: Performed by: NURSE ANESTHETIST, CERTIFIED REGISTERED

## 2023-09-21 PROCEDURE — 82728 ASSAY OF FERRITIN: CPT | Performed by: NURSE PRACTITIONER

## 2023-09-21 PROCEDURE — 43239 EGD BIOPSY SINGLE/MULTIPLE: CPT | Performed by: INTERNAL MEDICINE

## 2023-09-21 PROCEDURE — 85025 COMPLETE CBC W/AUTO DIFF WBC: CPT | Performed by: NURSE PRACTITIONER

## 2023-09-21 PROCEDURE — 84466 ASSAY OF TRANSFERRIN: CPT | Performed by: NURSE PRACTITIONER

## 2023-09-21 RX ORDER — EPHEDRINE SULFATE 5 MG/ML
INJECTION INTRAVENOUS AS NEEDED
Status: DISCONTINUED | OUTPATIENT
Start: 2023-09-21 | End: 2023-09-21 | Stop reason: SURG

## 2023-09-21 RX ORDER — MIDAZOLAM HYDROCHLORIDE 1 MG/ML
0.5 INJECTION INTRAMUSCULAR; INTRAVENOUS
Status: DISCONTINUED | OUTPATIENT
Start: 2023-09-21 | End: 2023-09-21 | Stop reason: HOSPADM

## 2023-09-21 RX ORDER — SODIUM CHLORIDE, SODIUM LACTATE, POTASSIUM CHLORIDE, CALCIUM CHLORIDE 600; 310; 30; 20 MG/100ML; MG/100ML; MG/100ML; MG/100ML
125 INJECTION, SOLUTION INTRAVENOUS ONCE
Status: COMPLETED | OUTPATIENT
Start: 2023-09-21 | End: 2023-09-21

## 2023-09-21 RX ORDER — PROPOFOL 10 MG/ML
INJECTION, EMULSION INTRAVENOUS CONTINUOUS PRN
Status: DISCONTINUED | OUTPATIENT
Start: 2023-09-21 | End: 2023-09-21 | Stop reason: SURG

## 2023-09-21 RX ORDER — SODIUM CHLORIDE, SODIUM LACTATE, POTASSIUM CHLORIDE, CALCIUM CHLORIDE 600; 310; 30; 20 MG/100ML; MG/100ML; MG/100ML; MG/100ML
100 INJECTION, SOLUTION INTRAVENOUS ONCE AS NEEDED
Status: DISCONTINUED | OUTPATIENT
Start: 2023-09-21 | End: 2023-09-21 | Stop reason: HOSPADM

## 2023-09-21 RX ORDER — OXYCODONE HYDROCHLORIDE AND ACETAMINOPHEN 5; 325 MG/1; MG/1
1 TABLET ORAL ONCE AS NEEDED
Status: DISCONTINUED | OUTPATIENT
Start: 2023-09-21 | End: 2023-09-21 | Stop reason: HOSPADM

## 2023-09-21 RX ORDER — FERROUS SULFATE 325(65) MG
325 TABLET ORAL DAILY
Qty: 30 TABLET | Refills: 2 | Status: SHIPPED | OUTPATIENT
Start: 2023-09-21

## 2023-09-21 RX ORDER — MISOPROSTOL 100 UG/1
100 TABLET ORAL 4 TIMES DAILY
Qty: 120 TABLET | Refills: 0 | Status: SHIPPED | OUTPATIENT
Start: 2023-09-21

## 2023-09-21 RX ORDER — FENTANYL CITRATE 50 UG/ML
50 INJECTION, SOLUTION INTRAMUSCULAR; INTRAVENOUS
Status: DISCONTINUED | OUTPATIENT
Start: 2023-09-21 | End: 2023-09-21 | Stop reason: HOSPADM

## 2023-09-21 RX ORDER — SODIUM CHLORIDE 0.9 % (FLUSH) 0.9 %
10 SYRINGE (ML) INJECTION AS NEEDED
Status: DISCONTINUED | OUTPATIENT
Start: 2023-09-21 | End: 2023-09-21 | Stop reason: HOSPADM

## 2023-09-21 RX ORDER — ATORVASTATIN CALCIUM 40 MG/1
40 TABLET, FILM COATED ORAL DAILY
COMMUNITY
Start: 2023-07-28

## 2023-09-21 RX ORDER — LIDOCAINE HYDROCHLORIDE 20 MG/ML
INJECTION, SOLUTION EPIDURAL; INFILTRATION; INTRACAUDAL; PERINEURAL AS NEEDED
Status: DISCONTINUED | OUTPATIENT
Start: 2023-09-21 | End: 2023-09-21 | Stop reason: SURG

## 2023-09-21 RX ORDER — IPRATROPIUM BROMIDE AND ALBUTEROL SULFATE 2.5; .5 MG/3ML; MG/3ML
3 SOLUTION RESPIRATORY (INHALATION) ONCE AS NEEDED
Status: DISCONTINUED | OUTPATIENT
Start: 2023-09-21 | End: 2023-09-21 | Stop reason: HOSPADM

## 2023-09-21 RX ORDER — SODIUM CHLORIDE 0.9 % (FLUSH) 0.9 %
10 SYRINGE (ML) INJECTION EVERY 12 HOURS SCHEDULED
Status: DISCONTINUED | OUTPATIENT
Start: 2023-09-21 | End: 2023-09-21 | Stop reason: HOSPADM

## 2023-09-21 RX ORDER — ONDANSETRON 2 MG/ML
4 INJECTION INTRAMUSCULAR; INTRAVENOUS AS NEEDED
Status: DISCONTINUED | OUTPATIENT
Start: 2023-09-21 | End: 2023-09-21 | Stop reason: HOSPADM

## 2023-09-21 RX ORDER — SODIUM CHLORIDE 9 MG/ML
40 INJECTION, SOLUTION INTRAVENOUS AS NEEDED
Status: DISCONTINUED | OUTPATIENT
Start: 2023-09-21 | End: 2023-09-21 | Stop reason: HOSPADM

## 2023-09-21 RX ADMIN — SODIUM CHLORIDE, POTASSIUM CHLORIDE, SODIUM LACTATE AND CALCIUM CHLORIDE: 600; 310; 30; 20 INJECTION, SOLUTION INTRAVENOUS at 10:19

## 2023-09-21 RX ADMIN — EPHEDRINE SULFATE 10 MG: 5 INJECTION INTRAVENOUS at 10:34

## 2023-09-21 RX ADMIN — PROPOFOL 200 MCG/KG/MIN: 10 INJECTION, EMULSION INTRAVENOUS at 10:19

## 2023-09-21 RX ADMIN — LIDOCAINE HYDROCHLORIDE 60 MG: 20 INJECTION, SOLUTION EPIDURAL; INFILTRATION; INTRACAUDAL; PERINEURAL at 10:19

## 2023-09-21 RX ADMIN — EPHEDRINE SULFATE 10 MG: 5 INJECTION INTRAVENOUS at 10:32

## 2023-09-21 NOTE — ANESTHESIA POSTPROCEDURE EVALUATION
Patient: America Avalos    Procedure Summary       Date: 09/21/23 Room / Location:  [facility] OR  /  [facility]    Anesthesia Start: 1018 Anesthesia Stop: 1039    Procedures:       ESOPHAGOGASTRODUODENOSCOPY WITH BIOPSY (Esophagus)      COLONOSCOPY FOR SCREENING Diagnosis:       RLQ abdominal pain      Encounter for screening for malignant neoplasm of colon      Epigastric pain      Gastroesophageal reflux disease, unspecified whether esophagitis present      Nausea and vomiting, unspecified vomiting type      Weight loss      (RLQ abdominal pain [R10.31])      (Encounter for screening for malignant neoplasm of colon [Z12.11])      (Epigastric pain [R10.13])      (Gastroesophageal reflux disease, unspecified whether esophagitis present [K21.9])      (Nausea and vomiting, unspecified vomiting type [R11.2])      (Weight loss [R63.4])    Surgeons: Tatyana Waters MD Provider: Tadeo Castillo DO    Anesthesia Type: general ASA Status: 3            Anesthesia Type: general    Vitals  Vitals Value Taken Time   BP 91/48 09/21/23 1040   Temp 97 °F (36.1 °C) 09/21/23 1040   Pulse 76 09/21/23 1040   Resp 18 09/21/23 1040   SpO2 99 % 09/21/23 1040           Post Anesthesia Care and Evaluation    Patient location during evaluation: PHASE II  Patient participation: complete - patient participated  Level of consciousness: awake and alert  Pain score: 0  Pain management: adequate    Airway patency: patent  Anesthetic complications: No anesthetic complications    Cardiovascular status: acceptable  Respiratory status: acceptable  Hydration status: acceptable    
Warm

## 2023-09-21 NOTE — ANESTHESIA PREPROCEDURE EVALUATION
Anesthesia Evaluation     Patient summary reviewed and Nursing notes reviewed   no history of anesthetic complications:                Airway   Mallampati: II  TM distance: >3 FB  Neck ROM: full  No difficulty expected  Dental - normal exam   (+) poor dentition    Pulmonary - negative pulmonary ROS and normal exam   Cardiovascular - normal exam  Exercise tolerance: good (4-7 METS)    NYHA Classification: II    (+) hyperlipidemia      Neuro/Psych  (+) psychiatric history  GI/Hepatic/Renal/Endo    (+) GERD    Musculoskeletal (-) negative ROS    Abdominal  - normal exam    Bowel sounds: normal.   Substance History - negative use     OB/GYN negative ob/gyn ROS         Other - negative ROS                       Anesthesia Plan    ASA 3     general     intravenous induction     Anesthetic plan, risks, benefits, and alternatives have been provided, discussed and informed consent has been obtained with: patient.      CODE STATUS:

## 2023-09-22 LAB — REF LAB TEST METHOD: NORMAL

## 2023-09-25 ENCOUNTER — LAB (OUTPATIENT)
Dept: LAB | Facility: HOSPITAL | Age: 78
End: 2023-09-25
Payer: MEDICARE

## 2023-09-25 DIAGNOSIS — D64.9 ANEMIA, UNSPECIFIED TYPE: ICD-10-CM

## 2023-09-25 LAB
BASOPHILS # BLD AUTO: 0.03 10*3/MM3 (ref 0–0.2)
BASOPHILS NFR BLD AUTO: 0.3 % (ref 0–1.5)
DEPRECATED RDW RBC AUTO: 54 FL (ref 37–54)
EOSINOPHIL # BLD AUTO: 0.16 10*3/MM3 (ref 0–0.4)
EOSINOPHIL NFR BLD AUTO: 1.7 % (ref 0.3–6.2)
ERYTHROCYTE [DISTWIDTH] IN BLOOD BY AUTOMATED COUNT: 15.9 % (ref 12.3–15.4)
HCT VFR BLD AUTO: 26.6 % (ref 34–46.6)
HGB BLD-MCNC: 8.5 G/DL (ref 12–15.9)
IMM GRANULOCYTES # BLD AUTO: 0.03 10*3/MM3 (ref 0–0.05)
IMM GRANULOCYTES NFR BLD AUTO: 0.3 % (ref 0–0.5)
LYMPHOCYTES # BLD AUTO: 1.63 10*3/MM3 (ref 0.7–3.1)
LYMPHOCYTES NFR BLD AUTO: 17.6 % (ref 19.6–45.3)
MCH RBC QN AUTO: 29.8 PG (ref 26.6–33)
MCHC RBC AUTO-ENTMCNC: 32 G/DL (ref 31.5–35.7)
MCV RBC AUTO: 93.3 FL (ref 79–97)
MONOCYTES # BLD AUTO: 0.72 10*3/MM3 (ref 0.1–0.9)
MONOCYTES NFR BLD AUTO: 7.8 % (ref 5–12)
NEUTROPHILS NFR BLD AUTO: 6.67 10*3/MM3 (ref 1.7–7)
NEUTROPHILS NFR BLD AUTO: 72.3 % (ref 42.7–76)
NRBC BLD AUTO-RTO: 0 /100 WBC (ref 0–0.2)
PLATELET # BLD AUTO: 546 10*3/MM3 (ref 140–450)
PMV BLD AUTO: 9.2 FL (ref 6–12)
RBC # BLD AUTO: 2.85 10*6/MM3 (ref 3.77–5.28)
WBC NRBC COR # BLD: 9.24 10*3/MM3 (ref 3.4–10.8)

## 2023-09-25 PROCEDURE — 85025 COMPLETE CBC W/AUTO DIFF WBC: CPT

## 2023-09-26 NOTE — PROGRESS NOTES
During your colonoscopy, there was old blood from the ulcer that had been bleeding.  The colon prep was poor.  You will need repeat colonoscopy within the next 3 months.

## 2023-09-26 NOTE — PROGRESS NOTES
At the time of your recent upper endoscopy, biopsies were taken of the esophagus.  Biopsies revealed reflux esophagitis.  Biopsies of the stomach were negative for H. pylori gastritis.  You did have an ulcer in the first part of your small bowel from NSAID use (ibuprofen).  Please avoid all NSAIDs.  Please continue Protonix 40 mg once daily.

## 2023-10-16 ENCOUNTER — OFFICE VISIT (OUTPATIENT)
Dept: PULMONOLOGY | Facility: CLINIC | Age: 78
End: 2023-10-16
Payer: MEDICARE

## 2023-10-16 VITALS
WEIGHT: 132 LBS | RESPIRATION RATE: 18 BRPM | BODY MASS INDEX: 21.99 KG/M2 | DIASTOLIC BLOOD PRESSURE: 80 MMHG | OXYGEN SATURATION: 98 % | SYSTOLIC BLOOD PRESSURE: 130 MMHG | HEIGHT: 65 IN | HEART RATE: 80 BPM | TEMPERATURE: 98 F

## 2023-10-16 DIAGNOSIS — Z72.0 TOBACCO ABUSE: ICD-10-CM

## 2023-10-16 DIAGNOSIS — R91.1 PULMONARY NODULE: Primary | ICD-10-CM

## 2023-10-16 PROCEDURE — 1160F RVW MEDS BY RX/DR IN RCRD: CPT | Performed by: INTERNAL MEDICINE

## 2023-10-16 PROCEDURE — 99204 OFFICE O/P NEW MOD 45 MIN: CPT | Performed by: INTERNAL MEDICINE

## 2023-10-16 PROCEDURE — 1159F MED LIST DOCD IN RCRD: CPT | Performed by: INTERNAL MEDICINE

## 2023-10-16 NOTE — PROGRESS NOTES
"  PULMONARY  NOTE    Chief Complaint     Pulmonary nodule, tobacco abuse, gastric ulcer    History of Present Illness     77-year-old female referred for an abnormal CT scan of the chest    She was having abdominal/back/chest pain when she went to the emergency room and underwent a CT scan of her chest, abdomen, and pelvis on 9/17/2023  This revealed an irregular right lower lobe soft tissue density    She underwent a GI evaluation which included a colonoscopy and upper endoscopy  She was found to have gastric ulcer felt to be related to nonsteroidal anti-inflammatories which she has been using to a large degree recently  She feels that she is doing better from that perspective avoiding nonsteroidal anti-inflammatories and taking a PPI  Her colonoscopy was a poor prep and she will need it repeated in 3 months    She has not had chest imaging in the past to her knowledge and we cannot find any record of any prior CT scans of the chest at [facility], or [facility] via care everywhere    She has no regular cough or sputum production  She is never had hemoptysis    She feels \"tired\" all the time not particularly limited by dyspnea with activity    Patient Active Problem List   Diagnosis    RLQ abdominal pain    Encounter for screening for malignant neoplasm of colon    Epigastric pain    Gastroesophageal reflux disease    Nausea and vomiting    Weight loss    Pulmonary nodule (~15mm irregular RLL)    Tobacco abuse      No Known Allergies    Current Outpatient Medications:     atorvastatin (LIPITOR) 40 MG tablet, Take 1 tablet by mouth Daily., Disp: , Rfl:     ferrous sulfate 325 (65 FE) MG tablet, Take 1 tablet by mouth Daily., Disp: 30 tablet, Rfl: 2    HYDROcodone-acetaminophen (NORCO) 5-325 MG per tablet, Take 1 tablet by mouth Every 6 (Six) Hours As Needed for Moderate Pain., Disp: 10 tablet, Rfl: 0    miSOPROStol (Cytotec) 100 MCG tablet, Take 1 tablet by mouth 4 (Four) Times a Day., Disp: 120 tablet, " "Rfl: 0    ondansetron ODT (ZOFRAN-ODT) 4 MG disintegrating tablet, Place 1 tablet on the tongue Every 6 (Six) Hours As Needed for Nausea., Disp: 12 tablet, Rfl: 0    pantoprazole (PROTONIX) 40 MG EC tablet, Take 1 tablet by mouth Daily., Disp: 30 tablet, Rfl: 3    sucralfate (Carafate) 1 GM/10ML suspension, Take 10 mL by mouth 4 (Four) Times a Day., Disp: 414 mL, Rfl: 0    venlafaxine XR (EFFEXOR-XR) 150 MG 24 hr capsule, Take 1 capsule by mouth Daily., Disp: , Rfl:   MEDICATION LIST AND ALLERGIES REVIEWED.    History reviewed. No pertinent family history.  Social History     Tobacco Use    Smoking status: Every Day     Packs/day: 0.50     Years: 30.00     Additional pack years: 0.00     Total pack years: 15.00     Types: Cigarettes    Smokeless tobacco: Never   Vaping Use    Vaping Use: Never used   Substance Use Topics    Alcohol use: Never    Drug use: Never     Social History     Social History Narrative    Not on file     FAMILY AND SOCIAL HISTORY REVIEWED.    Review of Systems  IF PRESENT REFER TO SCANNED ROS SHEET FROM SAME DATE  OTHERWISE ROS OBTAINED AND NON-CONTRIBUTORY OVER HPI.    /80   Pulse 80   Temp 98 °F (36.7 °C) (Temporal)   Resp 18   Ht 165.1 cm (65\")   Wt 59.9 kg (132 lb)   SpO2 98%   BMI 21.97 kg/m²   Physical Exam  Vitals and nursing note reviewed.   Constitutional:       General: She is not in acute distress.     Appearance: She is well-developed. She is not diaphoretic.   HENT:      Head: Normocephalic and atraumatic.   Neck:      Thyroid: No thyromegaly.   Cardiovascular:      Rate and Rhythm: Normal rate and regular rhythm.      Heart sounds: Normal heart sounds. No murmur heard.  Pulmonary:      Effort: Pulmonary effort is normal.      Breath sounds: Normal breath sounds. No stridor.   Lymphadenopathy:      Cervical: No cervical adenopathy.      Upper Body:      Right upper body: No supraclavicular or epitrochlear adenopathy.      Left upper body: No supraclavicular or " epitrochlear adenopathy.   Skin:     General: Skin is warm and dry.   Neurological:      Mental Status: She is alert and oriented to person, place, and time.   Psychiatric:         Behavior: Behavior normal.         Results     CT scan of the chest from 9/17/2023 revealed an irregular right lower lobe pulmonary nodule  No old chest CT scans available for comparison    Immunization History   Administered Date(s) Administered    COVID-19 (PFIZER) Purple Cap Monovalent 01/11/2021, 02/01/2021, 09/29/2021    Fluad Quad 65+ 09/11/2020    Fluzone High-Dose 65+yrs 09/22/2021, 10/08/2022    Influenza Quad Vaccine (Inpatient) 09/11/2020    Pneumococcal Conjugate 13-Valent (PCV13) 10/14/2022    Shingrix 03/19/2021, 05/19/2021     Problem List       ICD-10-CM ICD-9-CM   1. Pulmonary nodule (~15mm irregular RLL)  R91.1 793.11   2. Tobacco abuse  Z72.0 305.1       Discussion     Her son accompanied her to the office today  We reviewed her chest imaging  She has an irregular soft tissue density in the right lower lobe  We discussed differential diagnosis including the risk of lung cancer  Unfortunately, no old films, such as screening LDCT, available for comparison    We discussed options based on Fleischner Society guidelines  We discussed watchful waiting, further chest imaging, versus biopsy  At this point she would like to pursue a biopsy  We discussed biopsy options including surgical lung biopsy, CT FNA, and microscopy biopsy  We discussed navigational bronchoscopy including the risks and benefits with the risk of pneumothorax, hospitalization, chest tube placement in particular    Plan at this point is to pursue a navigational bronchoscopy which we will try to arrange in the next couple of weeks    We will discuss smoking cessation further on follow-up    Further work-up based on the results of the biopsy    Moderate level of Medical Decision Making complexity based on 1 undiagnosed new problem or 2 stable chronic  conditions, independent interpretation of tests, and/or prescription drug management       Dandre Carrillo MD  Note electronically signed    CC: Melissa Gerber MD

## 2023-10-16 NOTE — H&P (VIEW-ONLY)
"  PULMONARY  NOTE    Chief Complaint     Pulmonary nodule, tobacco abuse, gastric ulcer    History of Present Illness     77-year-old female referred for an abnormal CT scan of the chest    She was having abdominal/back/chest pain when she went to the emergency room and underwent a CT scan of her chest, abdomen, and pelvis on 9/17/2023  This revealed an irregular right lower lobe soft tissue density    She underwent a GI evaluation which included a colonoscopy and upper endoscopy  She was found to have gastric ulcer felt to be related to nonsteroidal anti-inflammatories which she has been using to a large degree recently  She feels that she is doing better from that perspective avoiding nonsteroidal anti-inflammatories and taking a PPI  Her colonoscopy was a poor prep and she will need it repeated in 3 months    She has not had chest imaging in the past to her knowledge and we cannot find any record of any prior CT scans of the chest at [facility], or [facility] via care everywhere    She has no regular cough or sputum production  She is never had hemoptysis    She feels \"tired\" all the time not particularly limited by dyspnea with activity    Patient Active Problem List   Diagnosis    RLQ abdominal pain    Encounter for screening for malignant neoplasm of colon    Epigastric pain    Gastroesophageal reflux disease    Nausea and vomiting    Weight loss    Pulmonary nodule (~15mm irregular RLL)    Tobacco abuse      No Known Allergies    Current Outpatient Medications:     atorvastatin (LIPITOR) 40 MG tablet, Take 1 tablet by mouth Daily., Disp: , Rfl:     ferrous sulfate 325 (65 FE) MG tablet, Take 1 tablet by mouth Daily., Disp: 30 tablet, Rfl: 2    HYDROcodone-acetaminophen (NORCO) 5-325 MG per tablet, Take 1 tablet by mouth Every 6 (Six) Hours As Needed for Moderate Pain., Disp: 10 tablet, Rfl: 0    miSOPROStol (Cytotec) 100 MCG tablet, Take 1 tablet by mouth 4 (Four) Times a Day., Disp: 120 tablet, " "Rfl: 0    ondansetron ODT (ZOFRAN-ODT) 4 MG disintegrating tablet, Place 1 tablet on the tongue Every 6 (Six) Hours As Needed for Nausea., Disp: 12 tablet, Rfl: 0    pantoprazole (PROTONIX) 40 MG EC tablet, Take 1 tablet by mouth Daily., Disp: 30 tablet, Rfl: 3    sucralfate (Carafate) 1 GM/10ML suspension, Take 10 mL by mouth 4 (Four) Times a Day., Disp: 414 mL, Rfl: 0    venlafaxine XR (EFFEXOR-XR) 150 MG 24 hr capsule, Take 1 capsule by mouth Daily., Disp: , Rfl:   MEDICATION LIST AND ALLERGIES REVIEWED.    History reviewed. No pertinent family history.  Social History     Tobacco Use    Smoking status: Every Day     Packs/day: 0.50     Years: 30.00     Additional pack years: 0.00     Total pack years: 15.00     Types: Cigarettes    Smokeless tobacco: Never   Vaping Use    Vaping Use: Never used   Substance Use Topics    Alcohol use: Never    Drug use: Never     Social History     Social History Narrative    Not on file     FAMILY AND SOCIAL HISTORY REVIEWED.    Review of Systems  IF PRESENT REFER TO SCANNED ROS SHEET FROM SAME DATE  OTHERWISE ROS OBTAINED AND NON-CONTRIBUTORY OVER HPI.    /80   Pulse 80   Temp 98 °F (36.7 °C) (Temporal)   Resp 18   Ht 165.1 cm (65\")   Wt 59.9 kg (132 lb)   SpO2 98%   BMI 21.97 kg/m²   Physical Exam  Vitals and nursing note reviewed.   Constitutional:       General: She is not in acute distress.     Appearance: She is well-developed. She is not diaphoretic.   HENT:      Head: Normocephalic and atraumatic.   Neck:      Thyroid: No thyromegaly.   Cardiovascular:      Rate and Rhythm: Normal rate and regular rhythm.      Heart sounds: Normal heart sounds. No murmur heard.  Pulmonary:      Effort: Pulmonary effort is normal.      Breath sounds: Normal breath sounds. No stridor.   Lymphadenopathy:      Cervical: No cervical adenopathy.      Upper Body:      Right upper body: No supraclavicular or epitrochlear adenopathy.      Left upper body: No supraclavicular or " epitrochlear adenopathy.   Skin:     General: Skin is warm and dry.   Neurological:      Mental Status: She is alert and oriented to person, place, and time.   Psychiatric:         Behavior: Behavior normal.         Results     CT scan of the chest from 9/17/2023 revealed an irregular right lower lobe pulmonary nodule  No old chest CT scans available for comparison    Immunization History   Administered Date(s) Administered    COVID-19 (PFIZER) Purple Cap Monovalent 01/11/2021, 02/01/2021, 09/29/2021    Fluad Quad 65+ 09/11/2020    Fluzone High-Dose 65+yrs 09/22/2021, 10/08/2022    Influenza Quad Vaccine (Inpatient) 09/11/2020    Pneumococcal Conjugate 13-Valent (PCV13) 10/14/2022    Shingrix 03/19/2021, 05/19/2021     Problem List       ICD-10-CM ICD-9-CM   1. Pulmonary nodule (~15mm irregular RLL)  R91.1 793.11   2. Tobacco abuse  Z72.0 305.1       Discussion     Her son accompanied her to the office today  We reviewed her chest imaging  She has an irregular soft tissue density in the right lower lobe  We discussed differential diagnosis including the risk of lung cancer  Unfortunately, no old films, such as screening LDCT, available for comparison    We discussed options based on Fleischner Society guidelines  We discussed watchful waiting, further chest imaging, versus biopsy  At this point she would like to pursue a biopsy  We discussed biopsy options including surgical lung biopsy, CT FNA, and microscopy biopsy  We discussed navigational bronchoscopy including the risks and benefits with the risk of pneumothorax, hospitalization, chest tube placement in particular    Plan at this point is to pursue a navigational bronchoscopy which we will try to arrange in the next couple of weeks    We will discuss smoking cessation further on follow-up    Further work-up based on the results of the biopsy    Moderate level of Medical Decision Making complexity based on 1 undiagnosed new problem or 2 stable chronic  conditions, independent interpretation of tests, and/or prescription drug management       Dandre Carrillo MD  Note electronically signed    CC: Melissa Gerber MD

## 2023-10-30 DIAGNOSIS — D68.9 COAGULATION DEFECT, UNSPECIFIED: ICD-10-CM

## 2023-10-30 DIAGNOSIS — R91.1 PULMONARY NODULE: Primary | ICD-10-CM

## 2023-10-31 ENCOUNTER — PATIENT OUTREACH (OUTPATIENT)
Dept: PULMONOLOGY | Facility: CLINIC | Age: 78
End: 2023-10-31
Payer: MEDICARE

## 2023-10-31 NOTE — PROGRESS NOTES
Nurse Navigator called to patient on this date.  Informed patient of upcoming appointments.  Instructed patient that her CT chest w/o contrast is scheduled for Monday, 11/6 at 12 pm at the [facility].  While patient is at [facility], patient will also be a walk-in for blood work and ECG.  Informed patient that her robotic navigational bronchoscopy has been scheduled for Tuesday, 11/7 to arrive at 8 am (for proc start at 9 am) at the [facility] outpatient surgery center.  Instructed patient that she will need a  the day of the procedure, as well as be NPO after midnight the evening prior.  All questions answered to patient's satisfaction.  NN contact information provided and encouraged for patient to call with any questions or concerns.  Patient verbalized understanding and is in agreement with her plan of care.

## 2023-11-01 DIAGNOSIS — Z01.818 ENCOUNTER FOR OTHER PREPROCEDURAL EXAMINATION: ICD-10-CM

## 2023-11-01 DIAGNOSIS — D68.9 COAGULATION DEFECT, UNSPECIFIED: ICD-10-CM

## 2023-11-01 DIAGNOSIS — R91.1 PULMONARY NODULE: Primary | ICD-10-CM

## 2023-11-06 ENCOUNTER — PATIENT OUTREACH (OUTPATIENT)
Dept: PULMONOLOGY | Facility: CLINIC | Age: 78
End: 2023-11-06
Payer: MEDICARE

## 2023-11-06 ENCOUNTER — HOSPITAL ENCOUNTER (OUTPATIENT)
Dept: RESPIRATORY THERAPY | Facility: HOSPITAL | Age: 78
Discharge: HOME | End: 2023-11-06
Payer: MEDICARE

## 2023-11-06 ENCOUNTER — LAB (OUTPATIENT)
Dept: LAB | Facility: HOSPITAL | Age: 78
End: 2023-11-06
Payer: MEDICARE

## 2023-11-06 ENCOUNTER — HOSPITAL ENCOUNTER (OUTPATIENT)
Dept: CT IMAGING | Facility: HOSPITAL | Age: 78
Discharge: HOME | End: 2023-11-06
Payer: MEDICARE

## 2023-11-06 DIAGNOSIS — R91.1 PULMONARY NODULE: ICD-10-CM

## 2023-11-06 DIAGNOSIS — Z01.818 ENCOUNTER FOR OTHER PREPROCEDURAL EXAMINATION: ICD-10-CM

## 2023-11-06 DIAGNOSIS — D68.9 COAGULATION DEFECT, UNSPECIFIED: ICD-10-CM

## 2023-11-06 LAB
BASOPHILS # BLD AUTO: 0.03 10*3/MM3 (ref 0–0.2)
BASOPHILS NFR BLD AUTO: 0.4 % (ref 0–1.5)
DEPRECATED RDW RBC AUTO: 51.8 FL (ref 37–54)
EOSINOPHIL # BLD AUTO: 0.06 10*3/MM3 (ref 0–0.4)
EOSINOPHIL NFR BLD AUTO: 0.8 % (ref 0.3–6.2)
ERYTHROCYTE [DISTWIDTH] IN BLOOD BY AUTOMATED COUNT: 14.9 % (ref 12.3–15.4)
HCT VFR BLD AUTO: 40.6 % (ref 34–46.6)
HGB BLD-MCNC: 12.5 G/DL (ref 12–15.9)
IMM GRANULOCYTES # BLD AUTO: 0.02 10*3/MM3 (ref 0–0.05)
IMM GRANULOCYTES NFR BLD AUTO: 0.3 % (ref 0–0.5)
INR PPP: 0.91 (ref 0.9–1.1)
LYMPHOCYTES # BLD AUTO: 2.04 10*3/MM3 (ref 0.7–3.1)
LYMPHOCYTES NFR BLD AUTO: 27.1 % (ref 19.6–45.3)
MCH RBC QN AUTO: 29.2 PG (ref 26.6–33)
MCHC RBC AUTO-ENTMCNC: 30.8 G/DL (ref 31.5–35.7)
MCV RBC AUTO: 94.9 FL (ref 79–97)
MONOCYTES # BLD AUTO: 0.47 10*3/MM3 (ref 0.1–0.9)
MONOCYTES NFR BLD AUTO: 6.2 % (ref 5–12)
NEUTROPHILS NFR BLD AUTO: 4.91 10*3/MM3 (ref 1.7–7)
NEUTROPHILS NFR BLD AUTO: 65.2 % (ref 42.7–76)
NRBC BLD AUTO-RTO: 0 /100 WBC (ref 0–0.2)
PLATELET # BLD AUTO: 411 10*3/MM3 (ref 140–450)
PMV BLD AUTO: 9.2 FL (ref 6–12)
PROTHROMBIN TIME: 12.7 SECONDS (ref 12.1–14.7)
QT INTERVAL: 384 MS
QTC INTERVAL: 417 MS
RBC # BLD AUTO: 4.28 10*6/MM3 (ref 3.77–5.28)
WBC NRBC COR # BLD: 7.53 10*3/MM3 (ref 3.4–10.8)

## 2023-11-06 PROCEDURE — 85730 THROMBOPLASTIN TIME PARTIAL: CPT | Performed by: INTERNAL MEDICINE

## 2023-11-06 PROCEDURE — 93010 ELECTROCARDIOGRAM REPORT: CPT | Performed by: INTERNAL MEDICINE

## 2023-11-06 PROCEDURE — 85025 COMPLETE CBC W/AUTO DIFF WBC: CPT | Performed by: INTERNAL MEDICINE

## 2023-11-06 PROCEDURE — 93005 ELECTROCARDIOGRAM TRACING: CPT | Performed by: INTERNAL MEDICINE

## 2023-11-06 PROCEDURE — 71250 CT THORAX DX C-: CPT | Performed by: RADIOLOGY

## 2023-11-06 PROCEDURE — 71250 CT THORAX DX C-: CPT

## 2023-11-06 PROCEDURE — 85610 PROTHROMBIN TIME: CPT | Performed by: INTERNAL MEDICINE

## 2023-11-06 PROCEDURE — 80053 COMPREHEN METABOLIC PANEL: CPT | Performed by: INTERNAL MEDICINE

## 2023-11-07 ENCOUNTER — APPOINTMENT (OUTPATIENT)
Dept: GENERAL RADIOLOGY | Facility: HOSPITAL | Age: 78
End: 2023-11-07
Payer: MEDICARE

## 2023-11-07 ENCOUNTER — ANESTHESIA (OUTPATIENT)
Dept: PERIOP | Facility: HOSPITAL | Age: 78
End: 2023-11-07
Payer: MEDICARE

## 2023-11-07 ENCOUNTER — ANESTHESIA EVENT (OUTPATIENT)
Dept: PERIOP | Facility: HOSPITAL | Age: 78
End: 2023-11-07
Payer: MEDICARE

## 2023-11-07 ENCOUNTER — PATIENT OUTREACH (OUTPATIENT)
Dept: PULMONOLOGY | Facility: CLINIC | Age: 78
End: 2023-11-07
Payer: MEDICARE

## 2023-11-07 ENCOUNTER — HOSPITAL ENCOUNTER (OUTPATIENT)
Facility: HOSPITAL | Age: 78
Setting detail: HOSPITAL OUTPATIENT SURGERY
Discharge: HOME | End: 2023-11-07
Attending: INTERNAL MEDICINE | Admitting: INTERNAL MEDICINE
Payer: MEDICARE

## 2023-11-07 VITALS
WEIGHT: 122 LBS | TEMPERATURE: 97.3 F | DIASTOLIC BLOOD PRESSURE: 49 MMHG | HEIGHT: 65 IN | OXYGEN SATURATION: 95 % | HEART RATE: 76 BPM | BODY MASS INDEX: 20.33 KG/M2 | RESPIRATION RATE: 18 BRPM | SYSTOLIC BLOOD PRESSURE: 127 MMHG

## 2023-11-07 DIAGNOSIS — R91.1 PULMONARY NODULE: ICD-10-CM

## 2023-11-07 PROCEDURE — 87205 SMEAR GRAM STAIN: CPT | Performed by: INTERNAL MEDICINE

## 2023-11-07 PROCEDURE — 87070 CULTURE OTHR SPECIMN AEROBIC: CPT | Performed by: INTERNAL MEDICINE

## 2023-11-07 PROCEDURE — 25810000003 LACTATED RINGERS PER 1000 ML: Performed by: ANESTHESIOLOGY

## 2023-11-07 PROCEDURE — 71045 X-RAY EXAM CHEST 1 VIEW: CPT

## 2023-11-07 PROCEDURE — 87206 SMEAR FLUORESCENT/ACID STAI: CPT | Performed by: INTERNAL MEDICINE

## 2023-11-07 PROCEDURE — 25010000002 FENTANYL CITRATE (PF) 50 MCG/ML SOLUTION: Performed by: NURSE ANESTHETIST, CERTIFIED REGISTERED

## 2023-11-07 PROCEDURE — 76000 FLUOROSCOPY <1 HR PHYS/QHP: CPT

## 2023-11-07 PROCEDURE — 87102 FUNGUS ISOLATION CULTURE: CPT | Performed by: INTERNAL MEDICINE

## 2023-11-07 PROCEDURE — 87116 MYCOBACTERIA CULTURE: CPT | Performed by: INTERNAL MEDICINE

## 2023-11-07 PROCEDURE — 25010000002 ONDANSETRON PER 1 MG: Performed by: NURSE ANESTHETIST, CERTIFIED REGISTERED

## 2023-11-07 PROCEDURE — 25010000002 PROPOFOL 200 MG/20ML EMULSION: Performed by: NURSE ANESTHETIST, CERTIFIED REGISTERED

## 2023-11-07 RX ORDER — ONDANSETRON 2 MG/ML
4 INJECTION INTRAMUSCULAR; INTRAVENOUS AS NEEDED
Status: DISCONTINUED | OUTPATIENT
Start: 2023-11-07 | End: 2023-11-07 | Stop reason: HOSPADM

## 2023-11-07 RX ORDER — SODIUM CHLORIDE 0.9 % (FLUSH) 0.9 %
10 SYRINGE (ML) INJECTION EVERY 12 HOURS SCHEDULED
Status: DISCONTINUED | OUTPATIENT
Start: 2023-11-07 | End: 2023-11-07 | Stop reason: HOSPADM

## 2023-11-07 RX ORDER — ROCURONIUM BROMIDE 10 MG/ML
INJECTION, SOLUTION INTRAVENOUS AS NEEDED
Status: DISCONTINUED | OUTPATIENT
Start: 2023-11-07 | End: 2023-11-07 | Stop reason: SURG

## 2023-11-07 RX ORDER — SODIUM CHLORIDE 0.9 % (FLUSH) 0.9 %
10 SYRINGE (ML) INJECTION AS NEEDED
Status: DISCONTINUED | OUTPATIENT
Start: 2023-11-07 | End: 2023-11-07 | Stop reason: HOSPADM

## 2023-11-07 RX ORDER — PHENYLEPHRINE HCL IN 0.9% NACL 1 MG/10 ML
SYRINGE (ML) INTRAVENOUS AS NEEDED
Status: DISCONTINUED | OUTPATIENT
Start: 2023-11-07 | End: 2023-11-07 | Stop reason: SURG

## 2023-11-07 RX ORDER — IPRATROPIUM BROMIDE AND ALBUTEROL SULFATE 2.5; .5 MG/3ML; MG/3ML
3 SOLUTION RESPIRATORY (INHALATION) ONCE AS NEEDED
Status: DISCONTINUED | OUTPATIENT
Start: 2023-11-07 | End: 2023-11-07 | Stop reason: HOSPADM

## 2023-11-07 RX ORDER — FENTANYL CITRATE 50 UG/ML
50 INJECTION, SOLUTION INTRAMUSCULAR; INTRAVENOUS
Status: DISCONTINUED | OUTPATIENT
Start: 2023-11-07 | End: 2023-11-07 | Stop reason: HOSPADM

## 2023-11-07 RX ORDER — ONDANSETRON 2 MG/ML
INJECTION INTRAMUSCULAR; INTRAVENOUS AS NEEDED
Status: DISCONTINUED | OUTPATIENT
Start: 2023-11-07 | End: 2023-11-07 | Stop reason: SURG

## 2023-11-07 RX ORDER — FENTANYL CITRATE 50 UG/ML
INJECTION, SOLUTION INTRAMUSCULAR; INTRAVENOUS AS NEEDED
Status: DISCONTINUED | OUTPATIENT
Start: 2023-11-07 | End: 2023-11-07 | Stop reason: SURG

## 2023-11-07 RX ORDER — PROPOFOL 10 MG/ML
INJECTION, EMULSION INTRAVENOUS AS NEEDED
Status: DISCONTINUED | OUTPATIENT
Start: 2023-11-07 | End: 2023-11-07 | Stop reason: SURG

## 2023-11-07 RX ORDER — OXYCODONE HYDROCHLORIDE AND ACETAMINOPHEN 5; 325 MG/1; MG/1
1 TABLET ORAL ONCE AS NEEDED
Status: DISCONTINUED | OUTPATIENT
Start: 2023-11-07 | End: 2023-11-07 | Stop reason: HOSPADM

## 2023-11-07 RX ORDER — SODIUM CHLORIDE 9 MG/ML
40 INJECTION, SOLUTION INTRAVENOUS AS NEEDED
Status: DISCONTINUED | OUTPATIENT
Start: 2023-11-07 | End: 2023-11-07 | Stop reason: HOSPADM

## 2023-11-07 RX ORDER — EPHEDRINE SULFATE 5 MG/ML
INJECTION INTRAVENOUS AS NEEDED
Status: DISCONTINUED | OUTPATIENT
Start: 2023-11-07 | End: 2023-11-07 | Stop reason: SURG

## 2023-11-07 RX ORDER — MIDAZOLAM HYDROCHLORIDE 1 MG/ML
0.5 INJECTION INTRAMUSCULAR; INTRAVENOUS
Status: DISCONTINUED | OUTPATIENT
Start: 2023-11-07 | End: 2023-11-07 | Stop reason: HOSPADM

## 2023-11-07 RX ORDER — MAGNESIUM HYDROXIDE 1200 MG/15ML
LIQUID ORAL AS NEEDED
Status: DISCONTINUED | OUTPATIENT
Start: 2023-11-07 | End: 2023-11-07 | Stop reason: HOSPADM

## 2023-11-07 RX ORDER — SODIUM CHLORIDE, SODIUM LACTATE, POTASSIUM CHLORIDE, CALCIUM CHLORIDE 600; 310; 30; 20 MG/100ML; MG/100ML; MG/100ML; MG/100ML
100 INJECTION, SOLUTION INTRAVENOUS ONCE AS NEEDED
Status: DISCONTINUED | OUTPATIENT
Start: 2023-11-07 | End: 2023-11-07 | Stop reason: HOSPADM

## 2023-11-07 RX ORDER — FAMOTIDINE 10 MG/ML
INJECTION, SOLUTION INTRAVENOUS AS NEEDED
Status: DISCONTINUED | OUTPATIENT
Start: 2023-11-07 | End: 2023-11-07 | Stop reason: SURG

## 2023-11-07 RX ORDER — SODIUM CHLORIDE, SODIUM LACTATE, POTASSIUM CHLORIDE, CALCIUM CHLORIDE 600; 310; 30; 20 MG/100ML; MG/100ML; MG/100ML; MG/100ML
125 INJECTION, SOLUTION INTRAVENOUS ONCE
Status: COMPLETED | OUTPATIENT
Start: 2023-11-07 | End: 2023-11-07

## 2023-11-07 RX ADMIN — EPHEDRINE SULFATE 10 MG: 5 INJECTION INTRAVENOUS at 10:33

## 2023-11-07 RX ADMIN — FENTANYL CITRATE 100 MCG: 50 INJECTION, SOLUTION INTRAMUSCULAR; INTRAVENOUS at 10:03

## 2023-11-07 RX ADMIN — Medication 100 MCG: at 10:17

## 2023-11-07 RX ADMIN — ROCURONIUM BROMIDE 30 MG: 10 SOLUTION INTRAVENOUS at 10:05

## 2023-11-07 RX ADMIN — EPHEDRINE SULFATE 10 MG: 5 INJECTION INTRAVENOUS at 10:24

## 2023-11-07 RX ADMIN — EPHEDRINE SULFATE 10 MG: 5 INJECTION INTRAVENOUS at 10:29

## 2023-11-07 RX ADMIN — Medication 100 MCG: at 10:31

## 2023-11-07 RX ADMIN — ONDANSETRON 4 MG: 2 INJECTION INTRAMUSCULAR; INTRAVENOUS at 10:03

## 2023-11-07 RX ADMIN — Medication 100 MCG: at 10:29

## 2023-11-07 RX ADMIN — Medication 100 MCG: at 10:33

## 2023-11-07 RX ADMIN — PROPOFOL 120 MG: 10 INJECTION, EMULSION INTRAVENOUS at 10:05

## 2023-11-07 RX ADMIN — Medication 100 MCG: at 10:25

## 2023-11-07 RX ADMIN — SODIUM CHLORIDE, POTASSIUM CHLORIDE, SODIUM LACTATE AND CALCIUM CHLORIDE: 600; 310; 30; 20 INJECTION, SOLUTION INTRAVENOUS at 08:21

## 2023-11-07 RX ADMIN — EPHEDRINE SULFATE 10 MG: 5 INJECTION INTRAVENOUS at 10:20

## 2023-11-07 RX ADMIN — Medication 100 MCG: at 10:20

## 2023-11-07 RX ADMIN — FAMOTIDINE 20 MG: 10 INJECTION, SOLUTION INTRAVENOUS at 10:03

## 2023-11-07 NOTE — ANESTHESIA PROCEDURE NOTES
Airway  Urgency: elective    Date/Time: 11/7/2023 10:06 AM  End Time:11/7/2023 10:06 AM  Airway not difficult    General Information and Staff    Patient location during procedure: OR  CRNA/CAA: Cornelio Terry CRNA    Indications and Patient Condition  Indications for airway management: airway protection    Preoxygenated: yes  MILS maintained throughout  Mask difficulty assessment: 0 - not attempted    Final Airway Details  Final airway type: endotracheal airway      Successful airway: ETT  Cuffed: yes   Successful intubation technique: direct laryngoscopy  Endotracheal tube insertion site: oral  Blade: Jesus  Blade size: 3  ETT size (mm): 8.0  Cormack-Lehane Classification: grade IIa - partial view of glottis  Placement verified by: chest auscultation, capnometry and palpation of cuff   Cuff volume (mL): 8  Measured from: lips  ETT/EBT  to lips (cm): 22  Number of attempts at approach: 1  Assessment: lips, teeth, and gum same as pre-op and atraumatic intubation

## 2023-11-07 NOTE — INTERVAL H&P NOTE
Discussed risks including the risks of PTX, hospitalization, need for chest tube, complications of persistent air leak, etc.    H&P reviewed. The patient was examined and there are no changes to the H&P.

## 2023-11-07 NOTE — OP NOTE
Bronchoscopy Procedure Note    Pre-op Diagnosis: RLL nodule    Post-op Diagnosis: Same    Surgeon: Dandre Carrillo MD    Anesthesia: General    Operation: Flexible fiberoptic bronchoscopy    Findings: Chronic bronchitic changes, no endobronchial lesion    Specimen(s): TBBx, TB Needle aspiration, Cytology brush, BAL RLLL    Estimated Blood Loss: Minimal/None    Complications: No immediate    Indications and History:  America Avalos is a 77 y.o. female  with history of tobacco abuse, COPD, and a suspicious right lower lobe lesion.  The risks, benefits, complications, treatment options and expected outcomes were discussed with the patient.  The possibilities of reaction to medication, pulmonary aspiration, perforation of a viscus, bleeding, failure to diagnose a condition and creating a complication requiring transfusion or operation were discussed with the patient who freely signed the consent.      Description of Procedure:  The patient was seen in the Holding Room and an immediate patient assessment was done prior to the administration of moderate and/or deep conscious sedation.  The patient was taken to the Endoscopy Suite, identified as America Avalos  and the procedure verified as Flexible Fiberoptic Bronchoscopy.  A Time Out was held and the above information confirmed.    After the induction of general anesthesia the patient was intubated with an 8.0 endotracheal tube    The bronchoscope was introduced via the endotracheal tube which confirmed good position of the distal one third of the trachea    Scope advanced into the right mainstem bronchus.  The right upper lobe, bronchus intermedius, right middle lobe, right lower lobe, and superior segment of the right lower lobe revealed chronic bronchitic changes and small to moderate amounts of clear to white secretions but no discrete endobronchial lesions.    Scope was advanced in the left mainstem bronchus.  The left upper lobe, lingula, left lower  lobe, and superior segment of the left lower lobe revealed no discrete endobronchial lesions but chronic bronchitic changes and moderate secretions.  The airways were suctioned clear    The regular scope was removed and the navigational catheter and camera were introduced.  Registration was performed using the navigational software.    Then, using a previously plotted course.  The camera and catheter were advanced out to within about 1 cm of the lesion.  Radial ultrasound was used to help localize the best concentric signal.  Fluoroscopy was used under my direct supervision without a radiologist present for obtaining biopsy specimens.    Multiple passes were taken with a 21-gauge transbronchial needle.  Then a 7 mm cytology brushing specimen was obtained followed by multiple transbronchial biopsies.  Then a BAL was obtained.  60 mL instilled and 30 mL returned.    The navigational equipment was removed and the regular scope was reintroduced.  The airways were suctioned clear and at the end of the procedure there was no active bleeding and no significant residual blood.  Scope withdrawn without difficulty.    Fluoroscopy    Fluoroscopy was performed under my direct supervision without a radiologist present for obtaining transbronchial biopsy and brushing specimens.  Fluoroscopy was used at the end of the procedure to exclude a pneumothorax.  None was identified.  Less than 5 minutes of fluoroscopy time was used in total.    The Patient was taken to the Endoscopy Recovery area in satisfactory condition.    Attestation: I performed the procedure    Dandre Carrillo MD, [facility]

## 2023-11-07 NOTE — ANESTHESIA POSTPROCEDURE EVALUATION
Patient: America Avalos    Procedure Summary       Date: 11/07/23 Room / Location:  [facility]  /  [facility]    Anesthesia Start: 1003 Anesthesia Stop: 1047    Procedure: BRONCHOSCOPY WITH ION ROBOT Diagnosis:       Pulmonary nodule      (Pulmonary nodule [R91.1])    Surgeons: Dandre Carrillo MD Provider: Honorio Gibbs MD    Anesthesia Type: general ASA Status: 3            Anesthesia Type: general    Vitals  Vitals Value Taken Time   /49 11/07/23 1110   Temp 97.3 °F (36.3 °C) 11/07/23 1050   Pulse 74 11/07/23 1113   Resp 18 11/07/23 1050   SpO2 93 % 11/07/23 1113   Vitals shown include unfiled device data.        Post Anesthesia Care and Evaluation    Patient location during evaluation: PHASE II  Patient participation: complete - patient participated  Level of consciousness: awake and alert  Pain score: 0  Pain management: adequate    Airway patency: patent  Anesthetic complications: No anesthetic complications  PONV Status: controlled  Cardiovascular status: acceptable  Respiratory status: acceptable and room air  Hydration status: euvolemic  No anesthesia care post op

## 2023-11-07 NOTE — ANESTHESIA PREPROCEDURE EVALUATION
Anesthesia Evaluation     Patient summary reviewed and Nursing notes reviewed   no history of anesthetic complications:   NPO Solid Status: > 8 hours  NPO Liquid Status: > 8 hours           Airway   Mallampati: II  TM distance: >3 FB  Neck ROM: full  No difficulty expected  Dental - normal exam   (+) poor dentition    Pulmonary - normal exam   (+) a smoker Current,  Cardiovascular - normal exam  Exercise tolerance: good (4-7 METS)    NYHA Classification: II    (+) hyperlipidemia      Neuro/Psych  (+) psychiatric history  GI/Hepatic/Renal/Endo    (+) GERD    Musculoskeletal (-) negative ROS    Abdominal  - normal exam    Bowel sounds: normal.   Substance History - negative use     OB/GYN negative ob/gyn ROS         Other - negative ROS       ROS/Med Hx Other: Pulmonary Nodule                    Anesthesia Plan    ASA 3     general     intravenous induction     Anesthetic plan, risks, benefits, and alternatives have been provided, discussed and informed consent has been obtained with: patient.        CODE STATUS:

## 2023-11-08 LAB
ACID FAST STN SPEC: NEGATIVE
SPECIMEN PREPARATION: NORMAL

## 2023-11-09 LAB
BACTERIA SPEC RESP CULT: NORMAL
GRAM STN SPEC: NORMAL
GRAM STN SPEC: NORMAL
REF LAB TEST METHOD: NORMAL

## 2023-11-10 DIAGNOSIS — C34.11 MALIGNANT NEOPLASM OF UPPER LOBE, RIGHT BRONCHUS OR LUNG: ICD-10-CM

## 2023-11-10 DIAGNOSIS — C34.91 PRIMARY LUNG CANCER, RIGHT: Primary | ICD-10-CM

## 2023-11-10 LAB
FUNGUS SPEC CULT: NORMAL
FUNGUS SPEC FUNGUS STN: NORMAL

## 2023-11-10 NOTE — PROGRESS NOTES
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  Cytology specimens are still pending.  I talked with the patient on the phone and communicated these results.  At this point, based on CT scan, she appears to have early stage disease.    I have recommended a PET CT scan and MRI of the brain to complete staging    We talked about surgery versus SBRT as primary treatment options if she does not fact have early stage disease.    We will get the PET scan and MRI done then I will have her come back to the office for follow-up and we will get PFTs and discuss next steps.

## 2023-11-10 NOTE — Clinical Note
I talked with her on the phone.  I have ordered a PET scan and an MRI of the brain.  Lets have her come back to the office week after next for follow-up and hopefully will have all the results and I can have them do spirometry in the office and then we will decide whether to refer her to radiation oncology or surgery.  Thank you

## 2023-11-13 ENCOUNTER — TELEPHONE (OUTPATIENT)
Dept: GASTROENTEROLOGY | Facility: CLINIC | Age: 78
End: 2023-11-13
Payer: MEDICARE

## 2023-11-13 LAB — REF LAB TEST METHOD: NORMAL

## 2023-11-14 NOTE — TELEPHONE ENCOUNTER
Misoprostol is taken for only a short period.  She does not need a refill.  It was only to treat an acute stomach ulcer.

## 2023-11-17 ENCOUNTER — PATIENT OUTREACH (OUTPATIENT)
Dept: PULMONOLOGY | Facility: CLINIC | Age: 78
End: 2023-11-17
Payer: MEDICARE

## 2023-11-22 ENCOUNTER — HOSPITAL ENCOUNTER (OUTPATIENT)
Dept: PET IMAGING | Facility: HOSPITAL | Age: 78
Discharge: HOME | End: 2023-11-22
Admitting: INTERNAL MEDICINE
Payer: MEDICARE

## 2023-11-22 DIAGNOSIS — C34.11 MALIGNANT NEOPLASM OF UPPER LOBE, RIGHT BRONCHUS OR LUNG: ICD-10-CM

## 2023-11-22 DIAGNOSIS — C34.91 PRIMARY LUNG CANCER, RIGHT: ICD-10-CM

## 2023-11-22 PROCEDURE — A9552 F18 FDG: HCPCS | Performed by: INTERNAL MEDICINE

## 2023-11-22 PROCEDURE — 0 FLUDEOXYGLUCOSE F18 SOLUTION: Performed by: INTERNAL MEDICINE

## 2023-11-22 PROCEDURE — 78815 PET IMAGE W/CT SKULL-THIGH: CPT

## 2023-11-22 RX ADMIN — FLUDEOXYGLUCOSE F 18 1 DOSE: 200 INJECTION, SOLUTION INTRAVENOUS at 10:48

## 2023-11-27 ENCOUNTER — OFFICE VISIT (OUTPATIENT)
Dept: PULMONOLOGY | Facility: CLINIC | Age: 78
End: 2023-11-27
Payer: MEDICARE

## 2023-11-27 VITALS
OXYGEN SATURATION: 94 % | SYSTOLIC BLOOD PRESSURE: 122 MMHG | HEIGHT: 65 IN | HEART RATE: 74 BPM | WEIGHT: 122 LBS | BODY MASS INDEX: 20.33 KG/M2 | TEMPERATURE: 98.1 F | DIASTOLIC BLOOD PRESSURE: 70 MMHG

## 2023-11-27 DIAGNOSIS — R91.1 PULMONARY NODULE: ICD-10-CM

## 2023-11-27 DIAGNOSIS — Z72.0 TOBACCO ABUSE: ICD-10-CM

## 2023-11-27 DIAGNOSIS — C34.91 PRIMARY LUNG CANCER, RIGHT: Primary | ICD-10-CM

## 2023-11-27 NOTE — PROGRESS NOTES
PULMONARY  NOTE    Chief Complaint     Limited stage lung cancer, tobacco abuse    History of Present Illness     78-year-old female returns today for follow-up  I originally saw her 10/16/2023    She was found incidentally to have a right lower lobe soft tissue density on CT scan of the abdomen pelvis in September    She had eventually underwent a navigational bronchoscopy which revealed adenocarcinoma  She underwent a PET scan on 11/22/2023 which revealed abnormal hypermetabolic activity in the right lung lesion but no other abnormal hypermetabolic activity  She is scheduled for an MRI on 11/29/2023    Since I saw her she has had no acute exacerbation of respiratory symptoms  No increased cough or sputum production  No hemoptysis    Patient Active Problem List   Diagnosis    RLQ abdominal pain    Encounter for screening for malignant neoplasm of colon    Epigastric pain    Gastroesophageal reflux disease    Nausea and vomiting    Weight loss    Pulmonary nodule (~15mm irregular RLL)    Tobacco abuse    Primary lung cancer, RLL      No Known Allergies    Current Outpatient Medications:     atorvastatin (LIPITOR) 40 MG tablet, Take 1 tablet by mouth Daily., Disp: , Rfl:     ferrous sulfate 325 (65 FE) MG tablet, Take 1 tablet by mouth Daily., Disp: 30 tablet, Rfl: 2    miSOPROStol (Cytotec) 100 MCG tablet, Take 1 tablet by mouth 4 (Four) Times a Day., Disp: 120 tablet, Rfl: 0    ondansetron ODT (ZOFRAN-ODT) 4 MG disintegrating tablet, Place 1 tablet on the tongue Every 6 (Six) Hours As Needed for Nausea., Disp: 12 tablet, Rfl: 0    pantoprazole (PROTONIX) 40 MG EC tablet, Take 1 tablet by mouth Daily., Disp: 30 tablet, Rfl: 3    sucralfate (Carafate) 1 GM/10ML suspension, Take 10 mL by mouth 4 (Four) Times a Day., Disp: 414 mL, Rfl: 0    venlafaxine XR (EFFEXOR-XR) 150 MG 24 hr capsule, Take 1 capsule by mouth Daily., Disp: , Rfl:   MEDICATION LIST AND ALLERGIES REVIEWED.    Family History   Problem Relation Age  "of Onset    No Known Problems Mother     No Known Problems Father      Social History     Tobacco Use    Smoking status: Every Day     Packs/day: 0.50     Years: 30.00     Additional pack years: 0.00     Total pack years: 15.00     Types: Cigarettes     Passive exposure: Current    Smokeless tobacco: Never   Vaping Use    Vaping Use: Never used   Substance Use Topics    Alcohol use: Never    Drug use: Never     Social History     Social History Narrative        Ongoing smoker with no plans for cessation     FAMILY AND SOCIAL HISTORY REVIEWED.    Review of Systems  IF PRESENT REFER TO SCANNED ROS SHEET FROM SAME DATE  OTHERWISE ROS OBTAINED AND NON-CONTRIBUTORY OVER HPI.    /70 (BP Location: Left arm, Patient Position: Sitting)   Pulse 74   Temp 98.1 °F (36.7 °C)   Ht 165.1 cm (65\")   Wt 55.3 kg (122 lb)   SpO2 94%   BMI 20.30 kg/m²   Physical Exam  Vitals and nursing note reviewed.   Constitutional:       General: She is not in acute distress.     Appearance: She is well-developed. She is not diaphoretic.   HENT:      Head: Normocephalic and atraumatic.   Neck:      Thyroid: No thyromegaly.   Cardiovascular:      Rate and Rhythm: Normal rate and regular rhythm.      Heart sounds: Normal heart sounds. No murmur heard.  Pulmonary:      Effort: Pulmonary effort is normal.      Breath sounds: Normal breath sounds. No stridor.   Lymphadenopathy:      Cervical: No cervical adenopathy.      Upper Body:      Right upper body: No supraclavicular or epitrochlear adenopathy.      Left upper body: No supraclavicular or epitrochlear adenopathy.   Skin:     General: Skin is warm and dry.   Neurological:      Mental Status: She is alert and oriented to person, place, and time.   Psychiatric:         Behavior: Behavior normal.         Results     PET CT reviewed on PACS.  The only abnormal hypermetabolic activity is in the RLL nodules.    MRI brain pending    Immunization History   Administered Date(s) " Administered    COVID-19 (PFIZER) Purple Cap Monovalent 01/11/2021, 02/01/2021, 09/29/2021    Fluad Quad 65+ 09/11/2020, 10/20/2023    Fluzone High-Dose 65+yrs 09/22/2021, 10/08/2022    Influenza Quad Vaccine (Inpatient) 09/11/2020    Pneumococcal Conjugate 13-Valent (PCV13) 10/14/2022    Shingrix 03/19/2021, 05/19/2021     Problem List       ICD-10-CM ICD-9-CM   1. Primary lung cancer, RLL  C34.91 162.9   2. Pulmonary nodule (~15mm irregular RLL)  R91.1 793.11   3. Tobacco abuse  Z72.0 305.1       Discussion     We reviewed her biopsy results again, her PET scan, and implications regarding the MRI of the brain  Pending the MRI of the brain at least at this point she appears to have limited stage lung cancer  We discussed options including surgical resection versus SBRT  She is very concerned about undergoing lung cancer surgery and is interested in SBRT  We will get her set up for radiation oncology referral    We did discuss the MRI of the brain and if something abnormal or identified there that would change her stage and treatment recommendations which she understands    I will plan to see her back in a month or 2 for follow-up and we will facilitate a radiation oncology referral    Moderate level of Medical Decision Making complexity based on 1 undiagnosed new problem or 2 stable chronic conditions, independent interpretation of tests, and/or prescription drug management     Dandre Carrillo MD  Note electronically signed    CC: Melissa Gerber MD

## 2023-11-29 ENCOUNTER — HOSPITAL ENCOUNTER (OUTPATIENT)
Dept: MRI IMAGING | Facility: HOSPITAL | Age: 78
Discharge: HOME | End: 2023-11-29
Admitting: INTERNAL MEDICINE
Payer: MEDICARE

## 2023-11-29 DIAGNOSIS — C34.91 PRIMARY LUNG CANCER, RIGHT: ICD-10-CM

## 2023-11-29 PROCEDURE — 70553 MRI BRAIN STEM W/O & W/DYE: CPT

## 2023-11-29 PROCEDURE — A9577 INJ MULTIHANCE: HCPCS | Performed by: INTERNAL MEDICINE

## 2023-11-29 PROCEDURE — 0 GADOBENATE DIMEGLUMINE 529 MG/ML SOLUTION: Performed by: INTERNAL MEDICINE

## 2023-11-29 PROCEDURE — 70553 MRI BRAIN STEM W/O & W/DYE: CPT | Performed by: RADIOLOGY

## 2023-11-29 RX ADMIN — GADOBENATE DIMEGLUMINE 10 ML: 529 INJECTION, SOLUTION INTRAVENOUS at 11:19

## 2023-12-05 ENCOUNTER — CONSULT (OUTPATIENT)
Dept: RADIATION ONCOLOGY | Facility: HOSPITAL | Age: 78
End: 2023-12-05
Payer: MEDICARE

## 2023-12-05 ENCOUNTER — HOSPITAL ENCOUNTER (OUTPATIENT)
Dept: RADIATION ONCOLOGY | Facility: HOSPITAL | Age: 78
Setting detail: RADIATION/ONCOLOGY SERIES
End: 2023-12-05
Payer: MEDICARE

## 2023-12-05 VITALS
HEART RATE: 70 BPM | WEIGHT: 128 LBS | RESPIRATION RATE: 18 BRPM | TEMPERATURE: 97.7 F | DIASTOLIC BLOOD PRESSURE: 76 MMHG | SYSTOLIC BLOOD PRESSURE: 124 MMHG | BODY MASS INDEX: 21.3 KG/M2 | OXYGEN SATURATION: 99 %

## 2023-12-05 DIAGNOSIS — R91.1 PULMONARY NODULE: ICD-10-CM

## 2023-12-05 DIAGNOSIS — C34.91 PRIMARY LUNG CANCER, RIGHT: Primary | ICD-10-CM

## 2023-12-05 PROCEDURE — G0463 HOSPITAL OUTPT CLINIC VISIT: HCPCS | Performed by: RADIOLOGY

## 2023-12-05 PROCEDURE — 77332 RADIATION TREATMENT AID(S): CPT | Performed by: RADIOLOGY

## 2023-12-05 PROCEDURE — 77334 RADIATION TREATMENT AID(S): CPT | Performed by: RADIOLOGY

## 2023-12-05 PROCEDURE — 77263 THER RADIOLOGY TX PLNG CPLX: CPT | Performed by: RADIOLOGY

## 2023-12-05 NOTE — PROGRESS NOTES
DATE OF CONSULT: 12/5/2023    REFERRING PHYSICIAN:  Dandre Carrillo MD    REASON FOR CONSULTATION:    Moderately differentiated adenocarcinoma arising in the superior segment of the right lower lobe. Review radiotherapy options.    HISTORY OF PRESENT ILLNESS:    Mrs. America Avalos is an 78-year-old white female who initially presented to medical attention in mid August 2023 with left flank and lower abdominal pain.  Upper GI endoscopy (09/21/2023) revealed a nonbleeding duodenal ulcer and reflux esophagitis.  Colonoscopy performed that same day was suboptimal due to poor bowel prep.  The patient reports that her GI symptoms have resolved with appropriate medications.    Imaging studies including CT scans of abdomen and pelvis (09/17/2023), CT angiogram (09/17/2023), and CT scans of chest (11/06/2023) obtained during her work-up for the above issues had revealed no acute abnormalities in the abdomen or pelvis.  A 15 mm spiculated nodule was noted in the superior segment of the right lower lobe.  No evidence of pulmonary embolism, aneurysm, dissection, hilar/mediastinal lymphadenopathy or other lung masses was identified.  Changes consistent with emphysema were noted.    Mrs. Avalos denied shortness of breath, productive cough, hemoptysis, hoarseness, swallowing issues, chest pain, fevers, chills, night sweats, fatigue, and diminished appetite.  She did report a nonintentional 10 pound weight loss due to GI issues.     The patient was referred to Dandre Carrillo MD of the pulmonary medicine service for evaluation.  The patient underwent navigational bronchoscopy on 11/07/2023.  Bronchial brushings and lavage of the right lower lobe bronchus revealed malignant cells.  Fine-needle aspiration biopsy of the right lower leg lung mass revealed findings consistent with moderately differentiated adenocarcinoma.  The specimen was TTF (+), Napsin A (+) and PD-L1 70%.    Mrs. Avalos's work-up has included a PET  CT scan (2023) which revealed hypermetabolism, SUV 5, in the right lung nodule.  No other sites of abnormal radionuclide uptake were identified.  Cranial MRI scan (2023) did not reveal any evidence of metastases.    The patient has reviewed the diagnosis of non-small cell lung cancer and curative treatment options with Dr. Carrillo.  The patient is referred to our service to review radiotherapy options in detail.      PAST MEDICAL HISTORY:    Past Medical History:   Diagnosis Date    Depression     GERD (gastroesophageal reflux disease)     Hyperlipidemia     Nausea and vomiting 2023    Primary lung cancer, RLL 11/10/2023       PAST SURGICAL HISTORY:  Past Surgical History:   Procedure Laterality Date    BRONCHOSCOPY WITH ION ROBOTIC ASSIST N/A 2023    Procedure: BRONCHOSCOPY WITH ION ROBOT;  Surgeon: Dandre Carrillo MD;  Location[facility];  Service: Robotics - Pulmonary;  Laterality: N/A;    COLONOSCOPY N/A 2023    Procedure: COLONOSCOPY FOR SCREENING;  Surgeon: Tatyana Waters MD;  Location: [facility] OR;  Service: Gastroenterology;  Laterality: N/A;    ENDOSCOPY N/A 2023    Procedure: ESOPHAGOGASTRODUODENOSCOPY WITH BIOPSY;  Surgeon: Tatyana Waters MD;  Location: [facility] OR;  Service: Gastroenterology;  Laterality: N/A;       ALLERGIES:    No Known Allergies    SOCIAL HISTORY:   Social History     Tobacco Use    Smoking status: Every Day     Packs/day: 0.50     Years: 30.00     Additional pack years: 0.00     Total pack years: 15.00     Types: Cigarettes     Passive exposure: Current    Smokeless tobacco: Never   Vaping Use    Vaping Use: Never used   Substance Use Topics    Alcohol use: Never    Drug use: Never     The patient resides at [place], [place].  .  One son living and apparently healthy. One son  from myocardial infarction at age 50 years.  Education: High school graduate.  No history of  service.  The patient is retired.   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  The patient has smoked 4 to 5 cigarettes/day x 30 years.  She denies use of other tobacco products.  The patient reports very occasional social use of EtOH.      CURRENT MEDICATIONS:    Current Outpatient Medications   Medication Sig Dispense Refill    atorvastatin (LIPITOR) 40 MG tablet Take 1 tablet by mouth Daily.      ferrous sulfate 325 (65 FE) MG tablet Take 1 tablet by mouth Daily. 30 tablet 2    miSOPROStol (Cytotec) 100 MCG tablet Take 1 tablet by mouth 4 (Four) Times a Day. 120 tablet 0    ondansetron ODT (ZOFRAN-ODT) 4 MG disintegrating tablet Place 1 tablet on the tongue Every 6 (Six) Hours As Needed for Nausea. 12 tablet 0    pantoprazole (PROTONIX) 40 MG EC tablet Take 1 tablet by mouth Daily. 30 tablet 3    sucralfate (Carafate) 1 GM/10ML suspension Take 10 mL by mouth 4 (Four) Times a Day. 414 mL 0    venlafaxine XR (EFFEXOR-XR) 150 MG 24 hr capsule Take 1 capsule by mouth Daily.       No current facility-administered medications for this visit.        HOME MEDICATIONS:   Current Outpatient Medications on File Prior to Visit   Medication Sig Dispense Refill    atorvastatin (LIPITOR) 40 MG tablet Take 1 tablet by mouth Daily.      ferrous sulfate 325 (65 FE) MG tablet Take 1 tablet by mouth Daily. 30 tablet 2    miSOPROStol (Cytotec) 100 MCG tablet Take 1 tablet by mouth 4 (Four) Times a Day. 120 tablet 0    ondansetron ODT (ZOFRAN-ODT) 4 MG disintegrating tablet Place 1 tablet on the tongue Every 6 (Six) Hours As Needed for Nausea. 12 tablet 0    pantoprazole (PROTONIX) 40 MG EC tablet Take 1 tablet by mouth Daily. 30 tablet 3    sucralfate (Carafate) 1 GM/10ML suspension Take 10 mL by mouth 4 (Four) Times a Day. 414 mL 0    venlafaxine XR (EFFEXOR-XR) 150 MG 24 hr capsule Take 1 capsule by mouth Daily.       No current facility-administered medications on file prior to visit.       FAMILY HISTORY:    Family History   Problem Relation Age of Onset     No Known Problems Mother     No Known Problems Father         Breast cancer                                                 Sister                            REVIEW OF SYSTEMS:      CONSTITUTIONAL: Patient denies fatigue and diminished appetite.  No recent infections, fevers, chills, night sweats.    EYES: Diminished vision bilaterally, uses glasses to read.    ENMT:  No epistaxis, mouth sores or difficulty swallowing.    RESPIRATORY:  No new shortness of breath. No new cough or hemoptysis.    CARDIOVASCULAR:  No chest pain or palpitations.    GASTROINTESTINAL:  No abdominal pain nausea, vomiting or blood in the stool.    GENITOURINARY: No Dysuria or Hematuria.    MUSCULOSKELETAL: Positive for occasional right shoulder discomfort.    LYMPHATICS:  Denies any abnormal swollen glands anywhere in the body.    IMMUNOLOGIC: Negative    ENDOCRINE:  Negative    NEUROLOGICAL : No tingling or numbness. No headache or dizziness. No seizures or balance problems.    SKIN: No new skin lesions.    ENDOCRINE : No new hot or cold intolerance. No new polyuria . No polydipsia.      PHYSICAL EXAMINATION:      VITAL SIGNS:  /76   Pulse 70   Temp 97.7 °F (36.5 °C) (Temporal)   Resp 18   Wt 58.1 kg (128 lb)   SpO2 99%   BMI 21.30 kg/m²       12/05/23  1302   Weight: 58.1 kg (128 lb)       KPS PERFORMANCE STATUS: 100    CONSTITUTIONAL: Patient is a fit appearing, well-nourished, well-developed elderly white female in no acute distress.     EYES: Mild conjunctival Pallor. No Icterus. No Pterygium. Extraocular Movements intact.No ptosis.    ENMT:  Normocephalic, Atraumatic.No Facial Asymmetry noted.  Natural lower teeth and upper dentures.  No intraoral lesions noted.    NECK:  No submental, submandibular, cervical or periclavicular lymphadenopathy.  Trachea midline. NO JVD.    RESPIRATORY:  Good air entry bilateral. No rhonchi or wheezing.Fair respiratory effort.    CARDIOVASCULAR:  S1, S2. Regular rate and rhythm. No murmur  or gallop appreciated.    ABDOMEN:  Soft, obese, nontender. Bowel sounds present in all four quadrants.  No Hepatosplenomegaly appreciated.  Laparoscopic tool insertion site noted near navel.    MUSCULOSKELETAL:  No edema.No Calf Tenderness. Normal range of motion.  No tenderness elicited on fist percussion of cervical, thoracic and lumbosacral spines.  No costal tenderness noted.    NEUROLOGIC:    No motor or sensory deficit appreciated. Cranial Nerves 2-12 grossly intact.  Normal gait.    SKIN : No skin lesions identified. Skin is warm and dry to touch.    LYMPHATICS: No detectable cervical, periclavicular, axillary or inguinal adenopathy.    EXTREMITIES: Symmetric.  All peripheral pulses present and normal.  No cyanosis, clubbing or edema.    PSYCHIATRY: Alert, awake and oriented ×3.Normal affect.  Normal judgment.  Makes good eye contact.    DIAGNOSTIC DATA:    WBC   Date Value Ref Range Status   11/06/2023 7.53 3.40 - 10.80 10*3/mm3 Final     RBC   Date Value Ref Range Status   11/06/2023 4.28 3.77 - 5.28 10*6/mm3 Final     Hemoglobin   Date Value Ref Range Status   11/06/2023 12.5 12.0 - 15.9 g/dL Final     Hematocrit   Date Value Ref Range Status   11/06/2023 40.6 34.0 - 46.6 % Final     MCV   Date Value Ref Range Status   11/06/2023 94.9 79.0 - 97.0 fL Final     MCH   Date Value Ref Range Status   11/06/2023 29.2 26.6 - 33.0 pg Final     MCHC   Date Value Ref Range Status   11/06/2023 30.8 (L) 31.5 - 35.7 g/dL Final     RDW   Date Value Ref Range Status   11/06/2023 14.9 12.3 - 15.4 % Final     RDW-SD   Date Value Ref Range Status   11/06/2023 51.8 37.0 - 54.0 fl Final     MPV   Date Value Ref Range Status   11/06/2023 9.2 6.0 - 12.0 fL Final     Platelets   Date Value Ref Range Status   11/06/2023 411 140 - 450 10*3/mm3 Final     Neutrophil %   Date Value Ref Range Status   11/06/2023 65.2 42.7 - 76.0 % Final     Lymphocyte %   Date Value Ref Range Status   11/06/2023 27.1 19.6 - 45.3 % Final     Monocyte %    Date Value Ref Range Status   11/06/2023 6.2 5.0 - 12.0 % Final     Eosinophil %   Date Value Ref Range Status   11/06/2023 0.8 0.3 - 6.2 % Final     Basophil %   Date Value Ref Range Status   11/06/2023 0.4 0.0 - 1.5 % Final     Immature Grans %   Date Value Ref Range Status   11/06/2023 0.3 0.0 - 0.5 % Final     Neutrophils, Absolute   Date Value Ref Range Status   11/06/2023 4.91 1.70 - 7.00 10*3/mm3 Final     Lymphocytes, Absolute   Date Value Ref Range Status   11/06/2023 2.04 0.70 - 3.10 10*3/mm3 Final     Monocytes, Absolute   Date Value Ref Range Status   11/06/2023 0.47 0.10 - 0.90 10*3/mm3 Final     Eosinophils, Absolute   Date Value Ref Range Status   11/06/2023 0.06 0.00 - 0.40 10*3/mm3 Final     Basophils, Absolute   Date Value Ref Range Status   11/06/2023 0.03 0.00 - 0.20 10*3/mm3 Final     Immature Grans, Absolute   Date Value Ref Range Status   11/06/2023 0.02 0.00 - 0.05 10*3/mm3 Final     nRBC   Date Value Ref Range Status   11/06/2023 0.0 0.0 - 0.2 /100 WBC Final     Glucose   Date Value Ref Range Status   11/06/2023 104 (H) 65 - 99 mg/dL Final     Sodium   Date Value Ref Range Status   11/06/2023 139 136 - 145 mmol/L Final     Potassium   Date Value Ref Range Status   11/06/2023 4.5 3.5 - 5.2 mmol/L Final     CO2   Date Value Ref Range Status   11/06/2023 23.9 22.0 - 29.0 mmol/L Final     Chloride   Date Value Ref Range Status   11/06/2023 104 98 - 107 mmol/L Final     Anion Gap   Date Value Ref Range Status   11/06/2023 11.1 5.0 - 15.0 mmol/L Final     Creatinine   Date Value Ref Range Status   11/06/2023 0.97 0.57 - 1.00 mg/dL Final   09/17/2023 0.90 0.60 - 1.30 mg/dL Final     BUN   Date Value Ref Range Status   11/06/2023 20 8 - 23 mg/dL Final   08/22/2023 16 6 - 20 mg/dL Final     BUN/Creatinine Ratio   Date Value Ref Range Status   11/06/2023 20.6 7.0 - 25.0 Final     Calcium   Date Value Ref Range Status   11/06/2023 9.7 8.6 - 10.5 mg/dL Final     Alkaline Phosphatase   Date Value Ref  Range Status   2023 116 39 - 117 U/L Final     Total Protein   Date Value Ref Range Status   2023 7.4 6.0 - 8.5 g/dL Final     ALT (SGPT)   Date Value Ref Range Status   2023 15 1 - 33 U/L Final     AST (SGOT)   Date Value Ref Range Status   2023 19 1 - 32 U/L Final     Total Bilirubin   Date Value Ref Range Status   2023 0.2 0.0 - 1.2 mg/dL Final     Albumin   Date Value Ref Range Status   2023 4.5 3.5 - 5.2 g/dL Final     Globulin   Date Value Ref Range Status   2023 2.9 gm/dL Final     Lab Results   Component Value Date    IRON 49 2023    TIBC 332 2023    LABIRON 15 (L) 2023    FERRITIN 121.30 2023     Lab Results   Component Value Date    REFLABREPO  2023     [facility]  [address] [place] [address]  Phone: [phone] or [phone]  Fax: [phone]  Bruce Anne M.D., Medical Director    PATIENT NAME                           LABORATORY NO.  [id number]  JAZMINE MURRELL                      [id number]  [id number]                         AGE              SEX  SSN           CLIENT REF #  [facility]              77      1945  F    [id number]   [id number]    [address]                     REQUESTING M.D.     ATTENDING M.D.     [place] [address]                   KETURAH LAFLEUR  DATE COLLECTED      DATE RECEIVED      DATE REPORTED  2023    DIAGNOSIS:  LUNG, BIOPSY, RIGHT LOWER LOBE: Malignant; moderately differentiated  adenocarcinoma    COMMENT:  The following immunohistochemical stains were performed and  reviewed with appropriate controls:    TTF-1: Positive  Napsin A: Positive  Synaptophysin: Negative  CD56: Negative    PD-L1 (22C3) Tumor Proportion Score: 70%: High PD-L1 expression    The PD-L1 22C3 immunohistochemical stain for non small cell lung carcinoma  is scored using the tumor proportion score (TPS). A minimum of  "100 tumor cells  is necessary for interpretation.  The TPS is calculated by dividing the number of  positive tumor cells by the total number of tumor cells, multiplied by 100. The  interpretative criteria are as follows: TPS <1%: no PD-L1 expression. TPS 1-  49%: PD-L1 expression. TPS %: high PD-L1 expression. The tissue was  stained with the PD-L1 22C3 antibody from [employer] on the [employer], a process which was validated internally as a laboratory developed test.  Stain is performed at [facility] and is interpreted by Dr. Boggs.    CLINICAL HISTORY:  Pulmonary nodule    SPECIMENS RECEIVED:  LUNG, BIOPSY, RIGHT LOWER LOBE    MICROSCOPIC DESCRIPTION:  Tissue blocks are prepared and slides are examined microscopically on all  specimens. See diagnosis for details.    The internal and external (both positive and negative) controls reacted  appropriately. Some of our immunohistochemical and in situ hybridization  studies are performed as analyte specific reagents. The following statement  applies to those tests: This test was developed, and its performance  characteristics determined by [facility]. It has not been  cleared or approved by the US Food and Drug Administration. However, the  FDA has determined that approval and clearance are not necessary.    Professional interpretation rendered by Erica Boggs D.O., F.C.A.P. at  [facility], [address] [place] [address].    GROSS DESCRIPTION:  Labeled \"lung right lower lobe\".  Consists of multiple pieces of tan soft tissue  measuring 0.6 x 0.6 x 0.2 cm in aggregate and is filtered and submitted entirely  in 1 block.  YANNI    REVIEWED, DIAGNOSED AND ELECTRONICALLY  SIGNED BY:    Erica Boggs D.O., F.C.A.P.  CPT CODES:  50783, 88341x4       Radiology Data :  Described above    Pathology :  Described above    ASSESSMENT:  Moderately differentiated adenocarcinoma arising in the superior segment of the " right lower lobe. ICD10: C34.31    Stage IA2 (cT1b cN0 M0)    RECOMMENDATIONS:  I have reviewed the patient's medical records and imaging studies.  The guidelines for non-small cell lung cancers of the National Comprehensive Cancer Network, version 5.2023, were consulted.  Mrs. Caal has reviewed the diagnosis of non-small cell lung cancer, the implications of that diagnosis, and curative treatment options with Dr. Carrillo. Mrs. Avalos is considered to be a candidate for definitive radiotherapy.    The patient appears to be an excellent candidate for stereotactic body radiotherapy (SBRT).  I explained to Mrs. Avalos that SBRT is a treatment modality that administers high doses of radiotherapy using multiple non-coplanar beams in a reduced number of treatment fractions to a target site.  SBRT maximizes the dose delivery to the tumor and minimizes the radiation exposure of adjacent normal tissues.  The unique radio- biologic characteristics of focused high-dose radiotherapy have been shown to provide improved tumor control rates when compared with conventionally fractionated radiotherapy.  SBRT is now the treatment of choice for early stage, non-small cell lung cancers and oligometastatic disease.    I explained to the patient that SBRT would consist in the administration of 5000 cGy in 1000 cGy treatment fractions delivered on an alternate day basis.  Pretreatment cone beam CT scans of the target site are obtained to assure uniformity in patient positioning and accuracy of treatment delivery.    I reviewed the rationale for SBRT, duration of treatment, expected outcomes, possible acute and chronic side effects, and posttreatment surveillance measures with the patient.  The patient's many treatment related questions were answered to her satisfaction.  Mrs. Avalos wishes to proceed with SBRT as outlined.  The patient underwent a treatment planning CT scan this afternoon.  I advised the patient that the  treatment planning process may take several days.  She will be contacted with the radiation therapy start date.  The patient was encouraged to stop smoking.  The patient declined referral to the smoking cessation program at [facility].    Time spent with patient 60 minutes (the total time included previsit review of medical records and imaging studies, obtaining an independent history of present illness, an appropriate medical examination/evaluation, patient counseling, and coordination of care).    Thank you very much for allowing our participation in this very pleasant lady's treatment.    Thank you for this consultation.    Rufus Isaacs MD  12/05/2023 15:03    cc:    MD Jennifer Trivedi APRN Ashlee Robinson, MD

## 2023-12-06 LAB
FUNGUS SPEC CULT: NORMAL
FUNGUS SPEC FUNGUS STN: NORMAL

## 2023-12-06 PROCEDURE — 77301 RADIOTHERAPY DOSE PLAN IMRT: CPT | Performed by: RADIOLOGY

## 2023-12-06 PROCEDURE — 77338 DESIGN MLC DEVICE FOR IMRT: CPT | Performed by: RADIOLOGY

## 2023-12-06 PROCEDURE — 77293 RESPIRATOR MOTION MGMT SIMUL: CPT | Performed by: RADIOLOGY

## 2023-12-06 PROCEDURE — 77300 RADIATION THERAPY DOSE PLAN: CPT | Performed by: RADIOLOGY

## 2023-12-11 ENCOUNTER — HOSPITAL ENCOUNTER (OUTPATIENT)
Dept: RADIATION ONCOLOGY | Facility: HOSPITAL | Age: 78
Discharge: HOME | End: 2023-12-11

## 2023-12-11 ENCOUNTER — HOSPITAL ENCOUNTER (OUTPATIENT)
Dept: RADIATION ONCOLOGY | Facility: HOSPITAL | Age: 78
Discharge: HOME | End: 2023-12-11
Payer: MEDICARE

## 2023-12-11 VITALS
RESPIRATION RATE: 18 BRPM | OXYGEN SATURATION: 99 % | TEMPERATURE: 97.8 F | DIASTOLIC BLOOD PRESSURE: 79 MMHG | SYSTOLIC BLOOD PRESSURE: 126 MMHG | HEART RATE: 79 BPM

## 2023-12-11 DIAGNOSIS — C34.91 PRIMARY LUNG CANCER, RIGHT: Primary | ICD-10-CM

## 2023-12-11 LAB
RAD ONC ARIA COURSE ID: NORMAL
RAD ONC ARIA COURSE INTENT: NORMAL
RAD ONC ARIA COURSE LAST TREATMENT DATE: NORMAL
RAD ONC ARIA COURSE START DATE: NORMAL
RAD ONC ARIA COURSE TREATMENT ELAPSED DAYS: 0
RAD ONC ARIA FIRST TREATMENT DATE: NORMAL
RAD ONC ARIA PLAN FRACTIONS TREATED TO DATE: 1
RAD ONC ARIA PLAN ID: NORMAL
RAD ONC ARIA PLAN PRESCRIBED DOSE PER FRACTION: 10 GY
RAD ONC ARIA PLAN PRIMARY REFERENCE POINT: NORMAL
RAD ONC ARIA PLAN TOTAL FRACTIONS PRESCRIBED: 5
RAD ONC ARIA PLAN TOTAL PRESCRIBED DOSE: 5000 CGY
RAD ONC ARIA REFERENCE POINT DOSAGE GIVEN TO DATE: 10 GY
RAD ONC ARIA REFERENCE POINT ID: NORMAL
RAD ONC ARIA REFERENCE POINT SESSION DOSAGE GIVEN: 10 GY

## 2023-12-11 PROCEDURE — 77336 RADIATION PHYSICS CONSULT: CPT | Performed by: RADIOLOGY

## 2023-12-11 PROCEDURE — 77373 STRTCTC BDY RAD THER TX DLVR: CPT | Performed by: RADIOLOGY

## 2023-12-11 NOTE — PROGRESS NOTES
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    This patient was seen today for an on treatment visit.  The patient initiated her course of SBRT today.  The first treatment fraction was administered without incident.    Radiation Treatments       Active   Plans   RLL SBRT   Most recent treatment: Dose planned: 1,000 cGy (fraction 1 on 2023)   Total: Dose planned: 5,000 cGy (5 fractions)   Elapsed Days: 0      Reference Points   PTV   Most recent treatment: Dose given: 1,000 cGy (on 2023)   Total: Dose given: 1,000 cGy   Elapsed Days: 0           Historical   No historical radiation treatments to show.              Subjective        Treatment Tolerance:  The patient states that all of her treatment related questions regarding SBRT have been answered to her satisfaction.    Medications:     Current Outpatient Medications:     atorvastatin (LIPITOR) 40 MG tablet, Take 1 tablet by mouth Daily., Disp: , Rfl:     ferrous sulfate 325 (65 FE) MG tablet, Take 1 tablet by mouth Daily., Disp: 30 tablet, Rfl: 2    miSOPROStol (Cytotec) 100 MCG tablet, Take 1 tablet by mouth 4 (Four) Times a Day., Disp: 120 tablet, Rfl: 0    ondansetron ODT (ZOFRAN-ODT) 4 MG disintegrating tablet, Place 1 tablet on the tongue Every 6 (Six) Hours As Needed for Nausea., Disp: 12 tablet, Rfl: 0    pantoprazole (PROTONIX) 40 MG EC tablet, Take 1 tablet by mouth Daily., Disp: 30 tablet, Rfl: 3    sucralfate (Carafate) 1 GM/10ML suspension, Take 10 mL by mouth 4 (Four) Times a Day., Disp: 414 mL, Rfl: 0    venlafaxine XR (EFFEXOR-XR) 150 MG 24 hr capsule, Take 1 capsule by mouth Daily., Disp: , Rfl:     Allergies:   No Known Allergies  Objective     Physical Exam:  Physical examination was deferred due to the recent treatment start and  absence of radiation therapy associated side effects.    Vital Signs: Blood pressure 126/79 mmHg, pulse 79, respirations 18, temperature 97.8 °F, pulse oximetry on room air 95%    Current Total XRT Dose (cGY):    Radiation Treatments       Active   Plans   RLL SBRT   Most recent treatment: Dose planned: 1,000 cGy (fraction 1 on 12/11/2023)   Total: Dose planned: 5,000 cGy (5 fractions)   Elapsed Days: 0      Reference Points   PTV   Most recent treatment: Dose given: 1,000 cGy (on 12/11/2023)   Total: Dose given: 1,000 cGy   Elapsed Days: 0           Historical   No historical radiation treatments to show.              Plan      Plan:   Patient was seen today for an on treatment visit. Patient is receiving SBRT radiation therapy to the nodular lesion in the superior segment of the right lower lobe.. Patient is stable and tolerating radiation therapy well with minimal side effects. Continue with radiation therapy.     Treatment surveillance measures were discussed.    I have reviewed treatment setup notes, checked and approved the daily guidance images. I reviewed dose delivery, treatment parameters and deemed them appropriate. We plan to continue radiation therapy as prescribed.     Digital speech recognition software was used to dictate parts of this note, some dictation errors may occur.    Rufus Isaacs MD   12/11/23 09:47 EST

## 2023-12-11 NOTE — ADDENDUM NOTE
Encounter addended by: Erica Rodríguez MA on: 12/11/2023 9:55 AM   Actions taken: Chief Complaint modified, Vitals modified, Order Reconciliation Section accessed, Medication List reviewed, Home Medications modified, Medication taking status modified, Allergies reviewed

## 2023-12-13 ENCOUNTER — HOSPITAL ENCOUNTER (OUTPATIENT)
Dept: RADIATION ONCOLOGY | Facility: HOSPITAL | Age: 78
Discharge: HOME | End: 2023-12-13

## 2023-12-13 LAB

## 2023-12-13 PROCEDURE — 77373 STRTCTC BDY RAD THER TX DLVR: CPT | Performed by: RADIOLOGY

## 2023-12-14 ENCOUNTER — PATIENT OUTREACH (OUTPATIENT)
Dept: ONCOLOGY | Facility: CLINIC | Age: 78
End: 2023-12-14
Payer: MEDICARE

## 2023-12-15 ENCOUNTER — HOSPITAL ENCOUNTER (OUTPATIENT)
Dept: RADIATION ONCOLOGY | Facility: HOSPITAL | Age: 78
Discharge: HOME | End: 2023-12-15

## 2023-12-15 LAB
RAD ONC ARIA COURSE ID: NORMAL
RAD ONC ARIA COURSE INTENT: NORMAL
RAD ONC ARIA COURSE LAST TREATMENT DATE: NORMAL
RAD ONC ARIA COURSE START DATE: NORMAL
RAD ONC ARIA COURSE TREATMENT ELAPSED DAYS: 4
RAD ONC ARIA FIRST TREATMENT DATE: NORMAL
RAD ONC ARIA PLAN FRACTIONS TREATED TO DATE: 3
RAD ONC ARIA PLAN ID: NORMAL
RAD ONC ARIA PLAN PRESCRIBED DOSE PER FRACTION: 10 GY
RAD ONC ARIA PLAN PRIMARY REFERENCE POINT: NORMAL
RAD ONC ARIA PLAN TOTAL FRACTIONS PRESCRIBED: 5
RAD ONC ARIA PLAN TOTAL PRESCRIBED DOSE: 5000 CGY
RAD ONC ARIA REFERENCE POINT DOSAGE GIVEN TO DATE: 30 GY
RAD ONC ARIA REFERENCE POINT ID: NORMAL
RAD ONC ARIA REFERENCE POINT SESSION DOSAGE GIVEN: 10 GY

## 2023-12-15 PROCEDURE — 77373 STRTCTC BDY RAD THER TX DLVR: CPT | Performed by: RADIOLOGY

## 2023-12-18 ENCOUNTER — HOSPITAL ENCOUNTER (OUTPATIENT)
Dept: RADIATION ONCOLOGY | Facility: HOSPITAL | Age: 78
Discharge: HOME | End: 2023-12-18
Payer: MEDICARE

## 2023-12-18 LAB
RAD ONC ARIA COURSE ID: NORMAL
RAD ONC ARIA COURSE INTENT: NORMAL
RAD ONC ARIA COURSE LAST TREATMENT DATE: NORMAL
RAD ONC ARIA COURSE START DATE: NORMAL
RAD ONC ARIA COURSE TREATMENT ELAPSED DAYS: 7
RAD ONC ARIA FIRST TREATMENT DATE: NORMAL
RAD ONC ARIA PLAN FRACTIONS TREATED TO DATE: 4
RAD ONC ARIA PLAN ID: NORMAL
RAD ONC ARIA PLAN PRESCRIBED DOSE PER FRACTION: 10 GY
RAD ONC ARIA PLAN PRIMARY REFERENCE POINT: NORMAL
RAD ONC ARIA PLAN TOTAL FRACTIONS PRESCRIBED: 5
RAD ONC ARIA PLAN TOTAL PRESCRIBED DOSE: 5000 CGY
RAD ONC ARIA REFERENCE POINT DOSAGE GIVEN TO DATE: 40 GY
RAD ONC ARIA REFERENCE POINT ID: NORMAL
RAD ONC ARIA REFERENCE POINT SESSION DOSAGE GIVEN: 10 GY

## 2023-12-18 PROCEDURE — 77373 STRTCTC BDY RAD THER TX DLVR: CPT | Performed by: RADIOLOGY

## 2023-12-20 ENCOUNTER — HOSPITAL ENCOUNTER (OUTPATIENT)
Dept: RADIATION ONCOLOGY | Facility: HOSPITAL | Age: 78
Discharge: HOME | End: 2023-12-20

## 2023-12-20 LAB
RAD ONC ARIA COURSE END DATE: NORMAL
RAD ONC ARIA COURSE ID: NORMAL
RAD ONC ARIA COURSE ID: NORMAL
RAD ONC ARIA COURSE INTENT: NORMAL
RAD ONC ARIA COURSE INTENT: NORMAL
RAD ONC ARIA COURSE LAST TREATMENT DATE: NORMAL
RAD ONC ARIA COURSE LAST TREATMENT DATE: NORMAL
RAD ONC ARIA COURSE START DATE: NORMAL
RAD ONC ARIA COURSE START DATE: NORMAL
RAD ONC ARIA COURSE TREATMENT ELAPSED DAYS: 9
RAD ONC ARIA COURSE TREATMENT ELAPSED DAYS: 9
RAD ONC ARIA FIRST TREATMENT DATE: NORMAL
RAD ONC ARIA FIRST TREATMENT DATE: NORMAL
RAD ONC ARIA PLAN FRACTIONS TREATED TO DATE: 5
RAD ONC ARIA PLAN FRACTIONS TREATED TO DATE: 5
RAD ONC ARIA PLAN ID: NORMAL
RAD ONC ARIA PLAN ID: NORMAL
RAD ONC ARIA PLAN NAME: NORMAL
RAD ONC ARIA PLAN PRESCRIBED DOSE PER FRACTION: 10 GY
RAD ONC ARIA PLAN PRESCRIBED DOSE PER FRACTION: 10 GY
RAD ONC ARIA PLAN PRIMARY REFERENCE POINT: NORMAL
RAD ONC ARIA PLAN PRIMARY REFERENCE POINT: NORMAL
RAD ONC ARIA PLAN TOTAL FRACTIONS PRESCRIBED: 5
RAD ONC ARIA PLAN TOTAL FRACTIONS PRESCRIBED: 5
RAD ONC ARIA PLAN TOTAL PRESCRIBED DOSE: 5000 CGY
RAD ONC ARIA PLAN TOTAL PRESCRIBED DOSE: 5000 CGY
RAD ONC ARIA REFERENCE POINT DOSAGE GIVEN TO DATE: 50 GY
RAD ONC ARIA REFERENCE POINT DOSAGE GIVEN TO DATE: 50 GY
RAD ONC ARIA REFERENCE POINT ID: NORMAL
RAD ONC ARIA REFERENCE POINT ID: NORMAL
RAD ONC ARIA REFERENCE POINT SESSION DOSAGE GIVEN: 10 GY

## 2023-12-20 PROCEDURE — 77373 STRTCTC BDY RAD THER TX DLVR: CPT | Performed by: RADIOLOGY

## 2023-12-23 LAB
ACID FAST STN SPEC: NEGATIVE
MYCOBACTERIUM SPEC QL CULT: NEGATIVE
SPECIMEN PREPARATION: NORMAL

## 2024-01-02 ENCOUNTER — PATIENT OUTREACH (OUTPATIENT)
Dept: ONCOLOGY | Facility: CLINIC | Age: 79
End: 2024-01-02
Payer: MEDICARE

## 2024-01-22 ENCOUNTER — OFFICE VISIT (OUTPATIENT)
Dept: RADIATION ONCOLOGY | Facility: HOSPITAL | Age: 79
End: 2024-01-22
Payer: MEDICARE

## 2024-01-22 ENCOUNTER — TELEPHONE (OUTPATIENT)
Dept: RADIATION ONCOLOGY | Facility: HOSPITAL | Age: 79
End: 2024-01-22

## 2024-01-22 VITALS
HEART RATE: 79 BPM | TEMPERATURE: 97.5 F | OXYGEN SATURATION: 99 % | DIASTOLIC BLOOD PRESSURE: 72 MMHG | SYSTOLIC BLOOD PRESSURE: 119 MMHG | RESPIRATION RATE: 18 BRPM

## 2024-01-22 DIAGNOSIS — C34.91 PRIMARY LUNG CANCER, RIGHT: Primary | ICD-10-CM

## 2024-01-22 PROCEDURE — G0463 HOSPITAL OUTPT CLINIC VISIT: HCPCS | Performed by: RADIOLOGY

## 2024-01-22 NOTE — TELEPHONE ENCOUNTER
Scheduled diagnostic CT of chest and a follow up after and pt had left appt prior to getting to give pt these appts. Printed appt reminders of CT on 3/5/2024 and follow up on 3/11/2024 of the CT and am going to mail those reminders out today for pt. Tried to call pt and phone rang to voicemail. Left msg to call back.

## 2024-01-22 NOTE — PROGRESS NOTES
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    History Summary:  Mrs. Avalos is an 78-year-old white female with the above diagnosis.  The patient underwent stereotactic body radiotherapy (SBRT) at this center.  The patient received 5000 cGy administered in 5 treatment fractions on an alternate day basis to the right lung mass.  SBRT was completed on 12/20/2023.    Mrs. Caal states that she is doing well.  She denies side effects from SBRT.  The patient states that she has no chest pains, shortness of breath, productive cough, hemoptysis, hoarseness, or recent nonintentional weight loss.    Review of Systems:  The 14 point review of systems was negative.    Past Medical History:   Diagnosis Date    Depression     GERD (gastroesophageal reflux disease)     Hyperlipidemia     Nausea and vomiting 09/18/2023    Primary lung cancer, RLL 11/10/2023        Past Surgical History:   Procedure Laterality Date    BRONCHOSCOPY WITH ION ROBOTIC ASSIST N/A 11/7/2023    Procedure: BRONCHOSCOPY WITH ION ROBOT;  Surgeon: Dandre Carrillo MD;  Location: [facility] OR;  Service: Robotics - Pulmonary;  Laterality: N/A;    COLONOSCOPY N/A 9/21/2023    Procedure: COLONOSCOPY FOR SCREENING;  Surgeon: Tatyana Waters MD;  Location: [facility] OR;  Service: Gastroenterology;  Laterality: N/A;    ENDOSCOPY N/A 9/21/2023    Procedure: ESOPHAGOGASTRODUODENOSCOPY WITH BIOPSY;  Surgeon: Tatyana Waters MD;  Location: [facility] OR;  Service: Gastroenterology;  Laterality: N/A;      Family History   Problem Relation Age of Onset    No Known Problems Mother     No Known Problems Father         Social History     Socioeconomic History    Marital status:    Tobacco  Use    Smoking status: Every Day     Packs/day: 0.50     Years: 30.00     Additional pack years: 0.00     Total pack years: 15.00     Types: Cigarettes     Passive exposure: Current    Smokeless tobacco: Never   Vaping Use    Vaping Use: Never used   Substance and Sexual Activity    Alcohol use: Never    Drug use: Never    Sexual activity: Defer     The patient is a current tobacco user and approximately 5 minutes was spent in tobacco cessation counseling.  The patient declined referral to the smoking cessation clinic at [facility].    Allergies:  Patient has no known allergies.   Prior to Admission medications    Medication Sig Start Date End Date Taking? Authorizing Provider   atorvastatin (LIPITOR) 40 MG tablet Take 1 tablet by mouth Daily. 23  Yes Ravinder Rodney MD   ferrous sulfate 325 (65 FE) MG tablet Take 1 tablet by mouth Daily. 23  Yes Radha Peng APRN   miSOPROStol (Cytotec) 100 MCG tablet Take 1 tablet by mouth 4 (Four) Times a Day. 23  Yes Tatyana Waters MD   ondansetron ODT (ZOFRAN-ODT) 4 MG disintegrating tablet Place 1 tablet on the tongue Every 6 (Six) Hours As Needed for Nausea. 23  Yes Ambrose Waddell PA   pantoprazole (PROTONIX) 40 MG EC tablet Take 1 tablet by mouth Daily. 23  Yes Radha Peng APRN   sucralfate (Carafate) 1 GM/10ML suspension Take 10 mL by mouth 4 (Four) Times a Day. 23  Yes Radha Peng APRN   venlafaxine XR (EFFEXOR-XR) 150 MG 24 hr capsule Take 1 capsule by mouth Daily. 23  Yes Ravinder Rodney MD      Pain:(on a scale of 0-10)    Pain Score    24 1454   PainSc: 0-No pain      America Avalos reports a pain score of 0.      Quality of Life:   KPS: 100  ECO    Physical Examination:  Vitals: /72, HR 79, RR 18, Parcher 97.5 °F, pulse oximetry on room air  Weight: 128 lbs    Constitutional: The patient is a well-developed, well-nourished elderly white female in no acute  "distress.  Alert and oriented ×3.  HEENT: Atraumatic. Normocephalic. No abnormalities noted.  Lymphatics: No cervical, supraclavicular, or axillary, or inguinal lymphadenopathy is palpated.  CV: Regular rate and rhythm.  No murmurs, rubs, or gallops are appreciated.  Respiratory: Lungs clear to auscultation.  Breath sounds equal bilaterally.  Breasts: not examined  GI: Abdomen soft, nontender, nondistended, with no hepatosplenomegaly or masses palpated.  Back: No radiation changes are noted over the right back or right flank region.  Extremities: No clubbing, cyanosis, or edema.  Neurologic: Cranial nerves II through XII are grossly intact, with no focal neurological deficits noted on exam.  Psychiatric: Alert and oriented x3. Normal affect, with no anxiety or depression noted.    Radiographs :   No radiographs within the last 60 days    Pathology:   Described above    Labs:   Lab Results   Component Value Date    GLUCOSE 104 (H) 11/06/2023    BUN 20 11/06/2023    CREATININE 0.97 11/06/2023    EGFR 59.6 (L) 11/06/2023    BCR 20.6 11/06/2023    K 4.5 11/06/2023    CO2 23.9 11/06/2023    CALCIUM 9.7 11/06/2023    ALBUMIN 4.5 11/06/2023    BILITOT 0.2 11/06/2023    AST 19 11/06/2023    ALT 15 11/06/2023      WBC   Date Value Ref Range Status   11/06/2023 7.53 3.40 - 10.80 10*3/mm3 Final     Hemoglobin   Date Value Ref Range Status   11/06/2023 12.5 12.0 - 15.9 g/dL Final     Hematocrit   Date Value Ref Range Status   11/06/2023 40.6 34.0 - 46.6 % Final     Platelets   Date Value Ref Range Status   11/06/2023 411 140 - 450 10*3/mm3 Final    No results found for: \"PSA\" No results found for: \"CEA\"     Assessment:  Stage I adenocarcinoma of the superior segment of the right lower lobe status post definitive SBRT.    Clinically stable.    Recommendations:  I explained to Mrs. Caal and her son that the patient's post radiotherapy progress will be assessed by serial chest imaging.  The patient will have a baseline post " SBRT CT scan of chest with contrast obtained in approximately 6 weeks.  The patient will then return to the cancer center to review results.  The patient was encouraged to discontinue smoking since this is a risk factor for development of new lung cancers.  Mrs. Caal was also encouraged to schedule follow-up appointments with Dr. Carrillo and with the other physicians involved in her care.    Time spent with patient 30 minutes (the total time included previsit review of medical records, obtaining an updated history of current illness, an appropriate medical examination/evaluation, patient counseling, and coordination of care).    Thank you again for allowing our participation in this very pleasant lady's treatment.    cc:  MD Melissa Gil MD Britney Hopkins, APRN    Electronically signed by Rufus Isaacs MD 1/22/2024  15:15 EST

## 2024-02-06 ENCOUNTER — PATIENT OUTREACH (OUTPATIENT)
Dept: ONCOLOGY | Facility: CLINIC | Age: 79
End: 2024-02-06
Payer: MEDICARE

## 2024-03-05 ENCOUNTER — HOSPITAL ENCOUNTER (OUTPATIENT)
Dept: RADIATION ONCOLOGY | Facility: HOSPITAL | Age: 79
Discharge: HOME | End: 2024-03-05
Admitting: RADIOLOGY
Payer: MEDICARE

## 2024-03-05 DIAGNOSIS — C34.91 PRIMARY LUNG CANCER, RIGHT: ICD-10-CM

## 2024-03-05 PROCEDURE — 71260 CT THORAX DX C+: CPT | Performed by: RADIOLOGY

## 2024-03-05 PROCEDURE — 71260 CT THORAX DX C+: CPT

## 2024-03-05 PROCEDURE — 25510000001 IOPAMIDOL 61 % SOLUTION: Performed by: RADIOLOGY

## 2024-03-05 RX ADMIN — IOPAMIDOL 70 ML: 612 INJECTION, SOLUTION INTRAVENOUS at 11:28

## 2024-03-11 ENCOUNTER — PATIENT OUTREACH (OUTPATIENT)
Dept: ONCOLOGY | Facility: CLINIC | Age: 79
End: 2024-03-11
Payer: MEDICARE

## 2024-03-11 ENCOUNTER — OFFICE VISIT (OUTPATIENT)
Dept: RADIATION ONCOLOGY | Facility: HOSPITAL | Age: 79
End: 2024-03-11
Payer: MEDICARE

## 2024-03-11 VITALS
TEMPERATURE: 97.8 F | OXYGEN SATURATION: 97 % | DIASTOLIC BLOOD PRESSURE: 65 MMHG | SYSTOLIC BLOOD PRESSURE: 123 MMHG | RESPIRATION RATE: 18 BRPM | WEIGHT: 137.2 LBS | BODY MASS INDEX: 22.83 KG/M2 | HEART RATE: 77 BPM

## 2024-03-11 DIAGNOSIS — C34.91 PRIMARY LUNG CANCER, RIGHT: Primary | ICD-10-CM

## 2024-03-11 PROCEDURE — G0463 HOSPITAL OUTPT CLINIC VISIT: HCPCS | Performed by: RADIOLOGY

## 2024-03-11 NOTE — PROGRESS NOTES
Established Patient Visit      Patient:              America Avalos   YOB: 1945   MRN:                  [id number]   Date of Visit:      March 11, 2024     Primary Diagnosis:   Stage IA2 (cT1b cN0 M0) moderately differentiated adenocarcinoma of the superior segment of the right lower lobe                                            History Summary:  Mrs. America Caal is an 78-year-old lady with biopsy-proven moderately differentiated adenocarcinoma in a spiculated 15 mm nodule in the superior segment of the right lower lobe.  Imaging studies have not revealed any findings worrisome for regional lymphatic or distant metastases.  Mrs. Caal completed a stereotactic body radiotherapy (SBRT) to the right lung nodule on 12/20/2023.  The patient received 5000 cGy in 1000 cGy treatment fractions delivered on an alternate day basis.    The patient returns today for follow-up evaluation.  She has no side effects attributable to SBRT.  The patient denies shortness of breath, dyspnea on exertion, productive cough, hemoptysis, hoarseness, swallowing difficulties, chest pain, and recent nonintentional weight loss.    CT scan of chest without contrast (03/05/2024) showed interval decrease in the right lower lobe nodule from 15 mm to 10 mm.  An adjacent stable 4 mm noncalcified nodule was present.  No other lung masses or nodules were noted.    Review of Systems:  Negative pulmonary assessment as described above.  The 14 point review of systems was otherwise noncontributory.    Past Medical History:   Diagnosis Date    Depression     GERD (gastroesophageal reflux disease)     Hyperlipidemia     Nausea and vomiting 09/18/2023    Primary lung cancer, RLL 11/10/2023        Past Surgical History:   Procedure Laterality Date    BRONCHOSCOPY WITH ION ROBOTIC ASSIST N/A 11/7/2023    Procedure: BRONCHOSCOPY WITH ION ROBOT;  Surgeon: Dandre Carrillo MD;  Location: [facility];  Service: Robotics -  Pulmonary;  Laterality: N/A;    COLONOSCOPY N/A 9/21/2023    Procedure: COLONOSCOPY FOR SCREENING;  Surgeon: Tatyana Waters MD;  Location: [facility] OR;  Service: Gastroenterology;  Laterality: N/A;    ENDOSCOPY N/A 9/21/2023    Procedure: ESOPHAGOGASTRODUODENOSCOPY WITH BIOPSY;  Surgeon: Tatyana Waters MD;  Location: [facility] OR;  Service: Gastroenterology;  Laterality: N/A;      Family History   Problem Relation Age of Onset    No Known Problems Mother     No Known Problems Father         Social History     Socioeconomic History    Marital status:    Tobacco Use    Smoking status: Every Day     Current packs/day: 0.50     Average packs/day: 0.5 packs/day for 30.0 years (15.0 ttl pk-yrs)     Types: Cigarettes     Passive exposure: Current    Smokeless tobacco: Never   Vaping Use    Vaping status: Never Used   Substance and Sexual Activity    Alcohol use: Never    Drug use: Never    Sexual activity: Defer     The patient is a current tobacco user and approximately 5 minutes was spent in tobacco cessation counseling.  The patient declined referral to the smoking cessation clinic at [facility].    Allergies:  Patient has no known allergies.   Prior to Admission medications    Medication Sig Start Date End Date Taking? Authorizing Provider   atorvastatin (LIPITOR) 40 MG tablet Take 1 tablet by mouth Daily. 7/28/23  Yes Provider, MD Ravinder   ferrous sulfate 325 (65 FE) MG tablet Take 1 tablet by mouth Daily. 9/21/23  Yes Radha Peng APRN   miSOPROStol (Cytotec) 100 MCG tablet Take 1 tablet by mouth 4 (Four) Times a Day. 9/21/23  Yes Tatyana Waters MD   ondansetron ODT (ZOFRAN-ODT) 4 MG disintegrating tablet Place 1 tablet on the tongue Every 6 (Six) Hours As Needed for Nausea. 9/17/23  Yes Ambrose Waddell PA   pantoprazole (PROTONIX) 40 MG EC tablet Take 1 tablet by mouth Daily. 9/18/23  Yes Radha Peng APRN   sucralfate (Carafate) 1 GM/10ML suspension  Take 10 mL by mouth 4 (Four) Times a Day. 23  Yes Radha Peng APRN   venlafaxine XR (EFFEXOR-XR) 150 MG 24 hr capsule Take 1 capsule by mouth Daily. 23  Yes Provider, MD Ravinder      Pain:(on a scale of 0-10)    Pain Score    24 1316   PainSc: 0-No pain      America Avalos reports a pain score of 0.       Quality of Life:   KPS: 100  ECO    Physical Examination:  Vitals: Blood pressure 123/65, pulse 77, respirations 18, temperature 97.8 °F and pulse oximetry on room air 97%     Weight: Weight: 62.2 kg (137 lb 3.2 oz) Body mass index is 22.83 kg/m².      Constitutional: The patient is a well-developed, well-nourished fit appearing elderly female in no acute distress.  Alert and oriented ×3.  HEENT: Atraumatic. Normocephalic. No abnormalities noted.  Lymphatics: No cervical, supraclavicular, or axillary lymphadenopathy is palpated.  CV: Regular rate and rhythm.  No murmurs, rubs, or gallops are appreciated.  Respiratory: Lungs clear to auscultation.  Breath sounds equal bilaterally.  Breasts: not examined  GI: Abdomen soft, nontender, nondistended, with no hepatosplenomegaly or masses palpated.  Extremities: No clubbing, cyanosis, or edema.  Neurologic: Cranial nerves II through XII are grossly intact, with no focal neurological deficits noted on exam.  Psychiatric: Alert and oriented x3. Normal affect, with no anxiety or depression noted.    Radiographs :   Described above    Pathology:   Described above    Labs:   Lab Results   Component Value Date    GLUCOSE 104 (H) 2023    BUN 20 2023    CREATININE 0.97 2023    EGFR 59.6 (L) 2023    BCR 20.6 2023    K 4.5 2023    CO2 23.9 2023    CALCIUM 9.7 2023    ALBUMIN 4.5 2023    BILITOT 0.2 2023    AST 19 2023    ALT 15 2023      WBC   Date Value Ref Range Status   2023 7.53 3.40 - 10.80 10*3/mm3 Final     Hemoglobin   Date Value Ref Range Status   2023  "12.5 12.0 - 15.9 g/dL Final     Hematocrit   Date Value Ref Range Status   11/06/2023 40.6 34.0 - 46.6 % Final     Platelets   Date Value Ref Range Status   11/06/2023 411 140 - 450 10*3/mm3 Final    No results found for: \"PSA\" No results found for: \"CEA\"     Assessment:  Stage IA2 (cT1b cN0 M0) moderately differentiated adenocarcinoma of the right lower lobe status post SBRT    The patient is clinically stable with reduction in size of the tumor nodule from 15 mm to 10 mm on recent imaging studies    Recommendations:  I explained to the patient and her son that her post-SBRT progress will be assessed by serial chest imaging.  The patient will have a CT scan of chest without contrast in approximately 3 months.  The patient will return to the cancer center to review results with me.  Mrs. Avalos will see Dr. Carrillo in follow-up later this month.    The patient continues to smoke  3 to 4 cigarettes daily.  Mrs. Avalos was advised that continued smoking places her at risk for development of a second pulmonary malignancy and tumor development at other sites.  The patient states that she enjoys smoking and she does not wish to quit.    Time spent with patient 30 minutes (the total time included previsit review of medical records and imaging studies, obtaining an updated history of present illness, performing an appropriate medical examination/evaluation, patient counseling, and coordination of care).    Thank you again for allowing our participation in this very pleasant lady's treatment    Electronically signed by Rufus Isaacs MD 3/11/2024  14:18 EDT    cc:  MD Jennifer Gil APRN Ashlee Robinson, MD      "

## 2024-06-07 ENCOUNTER — HOSPITAL ENCOUNTER (OUTPATIENT)
Dept: RADIATION ONCOLOGY | Facility: HOSPITAL | Age: 79
Discharge: HOME | End: 2024-06-07
Payer: MEDICARE

## 2024-06-07 DIAGNOSIS — C34.91 PRIMARY LUNG CANCER, RIGHT: ICD-10-CM

## 2024-06-07 PROCEDURE — 71250 CT THORAX DX C-: CPT

## 2024-06-14 ENCOUNTER — OFFICE VISIT (OUTPATIENT)
Dept: RADIATION ONCOLOGY | Facility: HOSPITAL | Age: 79
End: 2024-06-14
Payer: MEDICARE

## 2024-06-14 ENCOUNTER — TRANSCRIBE ORDERS (OUTPATIENT)
Dept: RADIATION ONCOLOGY | Facility: HOSPITAL | Age: 79
End: 2024-06-14
Payer: MEDICARE

## 2024-06-14 VITALS
TEMPERATURE: 97.8 F | OXYGEN SATURATION: 96 % | SYSTOLIC BLOOD PRESSURE: 136 MMHG | RESPIRATION RATE: 18 BRPM | DIASTOLIC BLOOD PRESSURE: 68 MMHG | HEART RATE: 72 BPM

## 2024-06-14 DIAGNOSIS — C34.91 PRIMARY LUNG CANCER WITH METASTASIS FROM LUNG TO OTHER SITE, RIGHT: Primary | ICD-10-CM

## 2024-06-14 DIAGNOSIS — C34.91 PRIMARY LUNG CANCER, RIGHT: Primary | ICD-10-CM

## 2024-06-14 PROCEDURE — G0463 HOSPITAL OUTPT CLINIC VISIT: HCPCS | Performed by: RADIOLOGY

## 2024-06-14 NOTE — PROGRESS NOTES
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  ICD 10 Code: C34.1                                            History of Present Illness: Ms. Avalos returns today as an established patient for an office visit.  She is known to have a stage I A2 (cT1b, cN0, M0) moderately differentiated adenocarcinoma involving the superior segment of the right lower lobe of the lung.  She completed a course of stereotactic body radiation therapy (SBRT) to the right lung nodule on 12/20/2023.  That area received 5000 cGy in 5 equal fractions given on alternate days.    Today the patient states she is doing quite well.  She has no pulmonary complaints.  Her appetite is healthy weight stable performance level high.  She relates no neurologic changes or skeletal discomfort.    The patient had a follow-up CT scan of the chest performed on 6/7/2024.  Tiny nodules have developed in the posterior inferior aspect of the right upper lobe adjacent to the major fissure.  The nodules measure 3 mm or less in size.  These were not on the previous scan and therefore was an increased suspicion for metastatic disease.  The previously treated nodule in the right lower lobe is slightly smaller.  It is down to 10.3 mm.  Small satellite nodules measuring 4.4 and 4.9 mm nodules showed no significant change.    (Old medical records were requested, reviewed, and summarized in the HPI)    Review of Systems       All other systems reviewed and are negative.      Past Medical History:   Diagnosis Date    Depression     GERD (gastroesophageal reflux disease)     Hyperlipidemia     Nausea and vomiting 09/18/2023    Primary lung cancer, RLL 11/10/2023        Past Surgical History:   Procedure Laterality  Date    BRONCHOSCOPY WITH ION ROBOTIC ASSIST N/A 11/7/2023    Procedure: BRONCHOSCOPY WITH ION ROBOT;  Surgeon: Dandre Carrillo MD;  Location:  [facility] OR;  Service: Robotics - Pulmonary;  Laterality: N/A;    COLONOSCOPY N/A 9/21/2023    Procedure: COLONOSCOPY FOR SCREENING;  Surgeon: Tatyana Waters MD;  Location:  [facility] OR;  Service: Gastroenterology;  Laterality: N/A;    ENDOSCOPY N/A 9/21/2023    Procedure: ESOPHAGOGASTRODUODENOSCOPY WITH BIOPSY;  Surgeon: Tatyana Waters MD;  Location:  [facility] OR;  Service: Gastroenterology;  Laterality: N/A;      Family History   Problem Relation Age of Onset    No Known Problems Mother     No Known Problems Father         Social History     Socioeconomic History    Marital status:    Tobacco Use    Smoking status: Every Day     Current packs/day: 0.50     Average packs/day: 0.5 packs/day for 30.0 years (15.0 ttl pk-yrs)     Types: Cigarettes     Passive exposure: Current    Smokeless tobacco: Never   Vaping Use    Vaping status: Never Used   Substance and Sexual Activity    Alcohol use: Never    Drug use: Never    Sexual activity: Defer         Allergies:  Patient has no known allergies.   Prior to Admission medications    Medication Sig Start Date End Date Taking? Authorizing Provider   atorvastatin (LIPITOR) 40 MG tablet Take 1 tablet by mouth Daily. 7/28/23   Provider, MD Ravinder   ferrous sulfate 325 (65 FE) MG tablet Take 1 tablet by mouth Daily. 9/21/23   Radha Peng APRN   miSOPROStol (Cytotec) 100 MCG tablet Take 1 tablet by mouth 4 (Four) Times a Day. 9/21/23   Tatyana Waters MD   ondansetron ODT (ZOFRAN-ODT) 4 MG disintegrating tablet Place 1 tablet on the tongue Every 6 (Six) Hours As Needed for Nausea. 9/17/23   Ambrose Waddell PA   pantoprazole (PROTONIX) 40 MG EC tablet Take 1 tablet by mouth Daily. 9/18/23   Radha Peng APRN   sucralfate (Carafate) 1 GM/10ML suspension Take 10 mL  by mouth 4 (Four) Times a Day. 9/18/23   Radha Peng APRN   venlafaxine XR (EFFEXOR-XR) 150 MG 24 hr capsule Take 1 capsule by mouth Daily. 8/2/23   Provider, MD Ravinder      Pain:(on a scale of 0-10)    There were no vitals filed for this visit.     America Avalos reports a pain score of .  Given her pain assessment as noted, treatment options were discussed and the following options were decided upon as a follow-up plan to address the patient's pain: continuation of current treatment plan for pain.       Quality of Life:   KPS: 100 - Full Activity  ECOG: (0) Fully active, able to carry on all predisease performance without restriction        Physical Examination:  Vitals:  VITALS@    Height:    Weight:   There is no height or weight on file to calculate BMI.    Physical Exam  Constitutional: The patient is a well-developed, well-nourished 78-year-old female in no acute distress.  Alert and oriented ×3.  HEENT: Atraumatic. Normocephalic. No abnormalities noted.  Lymphatics: No cervical, supraclavicular, or axillary lymphadenopathy is palpated.  CV: Regular rate and rhythm.  No murmurs, rubs, or gallops are appreciated.  Respiratory: Lungs clear to auscultation.  Breath sounds equal bilaterally.  GI: Abdomen soft, nontender, nondistended, with no hepatosplenomegaly or masses palpated.  Extremities: No clubbing, cyanosis, or edema.  Neurologic: Cranial nerves II through XII are grossly intact, with no focal neurological deficits noted on exam.  Psychiatric: Alert and oriented x3. Normal affect, with no anxiety or depression noted.  Skin: Intact and free of ecchymoses and/or petechiae.    Radiographs : CT scan 6/7/2024  Pathology: No new pathology  Labs:   Lab Results   Component Value Date    GLUCOSE 104 (H) 11/06/2023    BUN 20 11/06/2023    CREATININE 0.97 11/06/2023    EGFR 59.6 (L) 11/06/2023    BCR 20.6 11/06/2023    K 4.5 11/06/2023    CO2 23.9 11/06/2023    CALCIUM 9.7 11/06/2023    ALBUMIN  "4.5 11/06/2023    BILITOT 0.2 11/06/2023    AST 19 11/06/2023    ALT 15 11/06/2023      WBC   Date Value Ref Range Status   11/06/2023 7.53 3.40 - 10.80 10*3/mm3 Final     Hemoglobin   Date Value Ref Range Status   11/06/2023 12.5 12.0 - 15.9 g/dL Final     Hematocrit   Date Value Ref Range Status   11/06/2023 40.6 34.0 - 46.6 % Final     Platelets   Date Value Ref Range Status   11/06/2023 411 140 - 450 10*3/mm3 Final    No results found for: \"PSA\" No results found for: \"CEA\"         ASSESSMENT/PLAN: (C34.1) Stage Ia2, adenocarcinoma of the right lower lobe of the lung status post SBRT.  Recent CT scan of the chest shows some new nodules in the right upper lobe.  The largest measures 3 mm.  They were not present on the previous scan.  They are suspicious for disease.  At this point they are too small to biopsy and would not be large enough to show up on PET scan.  The changes in the right lower lobe show improvement of the nodule treated.  Post SBRT changes are also noted.    I reviewed the results of the current CT scan with the patient.  A follow-up CT scan will be repeated in 3 months.    Sincerely,      Electronically signed by Bruce Ward MD 6/14/2024  10:13 EDT    "

## 2024-06-17 ENCOUNTER — OFFICE VISIT (OUTPATIENT)
Dept: PULMONOLOGY | Facility: CLINIC | Age: 79
End: 2024-06-17
Payer: MEDICARE

## 2024-06-17 VITALS
TEMPERATURE: 98 F | DIASTOLIC BLOOD PRESSURE: 82 MMHG | OXYGEN SATURATION: 97 % | BODY MASS INDEX: 20.83 KG/M2 | SYSTOLIC BLOOD PRESSURE: 120 MMHG | HEART RATE: 82 BPM | WEIGHT: 125 LBS | HEIGHT: 65 IN

## 2024-06-17 DIAGNOSIS — K21.00 GASTROESOPHAGEAL REFLUX DISEASE WITH ESOPHAGITIS WITHOUT HEMORRHAGE: ICD-10-CM

## 2024-06-17 DIAGNOSIS — Z72.0 TOBACCO ABUSE: ICD-10-CM

## 2024-06-17 DIAGNOSIS — C34.91 PRIMARY LUNG CANCER, RIGHT: Primary | ICD-10-CM

## 2024-06-17 PROCEDURE — 99214 OFFICE O/P EST MOD 30 MIN: CPT | Performed by: INTERNAL MEDICINE

## 2024-06-17 NOTE — PROGRESS NOTES
PULMONARY  NOTE    Chief Complaint     Limited stage lung cancer, tobacco abuse    History of Present Illness     78-year-old female returns today for follow-up  I last saw her 11/27/2023    She was found incidentally to have a right lower lobe pulmonary nodule for which she underwent a navigational bronchoscopy that revealed adenocarcinoma.  This appeared to be limited stage and therefore underwent SBRT  Most recent chest imaging is as noted below    She has no regular cough or sputum production  She has had no hemoptysis  No exacerbation of respiratory symptoms    Has a history of tobacco abuse    Patient Active Problem List   Diagnosis    RLQ abdominal pain    Encounter for screening for malignant neoplasm of colon    Epigastric pain    Gastroesophageal reflux disease    Nausea and vomiting    Weight loss    Pulmonary nodule (~15mm irregular RLL)    Tobacco abuse    Primary lung cancer, RLL      No Known Allergies    Current Outpatient Medications:     atorvastatin (LIPITOR) 40 MG tablet, Take 1 tablet by mouth Daily., Disp: , Rfl:     Cyanocobalamin 1000 MCG/ML kit, Inject  as directed., Disp: , Rfl:     ferrous sulfate 325 (65 FE) MG tablet, Take 1 tablet by mouth Daily., Disp: 30 tablet, Rfl: 2    miSOPROStol (Cytotec) 100 MCG tablet, Take 1 tablet by mouth 4 (Four) Times a Day., Disp: 120 tablet, Rfl: 0    ondansetron ODT (ZOFRAN-ODT) 4 MG disintegrating tablet, Place 1 tablet on the tongue Every 6 (Six) Hours As Needed for Nausea., Disp: 12 tablet, Rfl: 0    pantoprazole (PROTONIX) 40 MG EC tablet, Take 1 tablet by mouth Daily., Disp: 30 tablet, Rfl: 3    sucralfate (Carafate) 1 GM/10ML suspension, Take 10 mL by mouth 4 (Four) Times a Day., Disp: 414 mL, Rfl: 0    venlafaxine XR (EFFEXOR-XR) 150 MG 24 hr capsule, Take 1 capsule by mouth Daily., Disp: , Rfl:   MEDICATION LIST AND ALLERGIES REVIEWED.    Family History   Problem Relation Age of Onset    No Known Problems Mother     No Known Problems Father   "    Social History     Tobacco Use    Smoking status: Every Day     Current packs/day: 0.50     Average packs/day: 0.5 packs/day for 30.0 years (15.0 ttl pk-yrs)     Types: Cigarettes     Passive exposure: Current    Smokeless tobacco: Never    Tobacco comments:     Pt is down to 1-2 cigarettes daily   Vaping Use    Vaping status: Never Used   Substance Use Topics    Alcohol use: Never    Drug use: Never     Social History     Social History Narrative        Ongoing smoker with no plans for cessation     FAMILY AND SOCIAL HISTORY REVIEWED.    Review of Systems  IF PRESENT REFER TO SCANNED ROS SHEET FROM SAME DATE  OTHERWISE ROS OBTAINED AND NON-CONTRIBUTORY OVER HPI.    /82   Pulse 82   Temp 98 °F (36.7 °C)   Ht 165.1 cm (65\")   Wt 56.7 kg (125 lb)   SpO2 97%   BMI 20.80 kg/m²   Physical Exam  Vitals and nursing note reviewed.   Constitutional:       General: She is not in acute distress.     Appearance: She is well-developed. She is not diaphoretic.   HENT:      Head: Normocephalic and atraumatic.   Neck:      Thyroid: No thyromegaly.   Cardiovascular:      Rate and Rhythm: Normal rate and regular rhythm.      Heart sounds: Normal heart sounds. No murmur heard.  Pulmonary:      Effort: Pulmonary effort is normal.      Breath sounds: Normal breath sounds. No stridor.   Lymphadenopathy:      Cervical: No cervical adenopathy.      Upper Body:      Right upper body: No supraclavicular or epitrochlear adenopathy.      Left upper body: No supraclavicular or epitrochlear adenopathy.   Skin:     General: Skin is warm and dry.   Neurological:      Mental Status: She is alert and oriented to person, place, and time.   Psychiatric:         Behavior: Behavior normal.         Results     CT scan of chest from 6/7/2024 reviewed on PACS  Interval decrease in the size of the right lower lobe pulmonary nodule with a few new surrounding nodules    Immunization History   Administered Date(s) Administered    " COVID-19 (PFIZER) Purple Cap Monovalent 01/11/2021, 02/01/2021, 09/29/2021    Fluad Quad 65+ 09/11/2020, 10/20/2023    Fluzone High-Dose 65+yrs 09/22/2021, 10/08/2022    Influenza Quad Vaccine (Inpatient) 09/11/2020    Pneumococcal Conjugate 13-Valent (PCV13) 10/14/2022    Shingrix 03/19/2021, 05/19/2021     Problem List       ICD-10-CM ICD-9-CM   1. Primary lung cancer, RLL  C34.91 162.9   2. Tobacco abuse  Z72.0 305.1   3. Gastroesophageal reflux disease with esophagitis without hemorrhage  K21.00 530.81     530.10       Discussion     We discussed her chest imaging  Plan at this point is just to follow-up with a relatively short interval follow-up CT scan of the chest  Hopefully there will be anything new or progressive    Encourage smoking cessation efforts    Would recommend albuterol to use on an as-needed basis for cough or congestion    I will see her back in 6 months or earlier if there are any problems with her follow-up CT scan    Moderate level of Medical Decision Making complexity based on 2 or more chronic stable illnesses and an independent review of test results and/or prescription drug management.    Dandre Carrillo MD  Note electronically signed    CC: Melissa Gerbre MD

## 2024-09-03 ENCOUNTER — HOSPITAL ENCOUNTER (OUTPATIENT)
Dept: RADIATION ONCOLOGY | Facility: HOSPITAL | Age: 79
Discharge: HOME | End: 2024-09-03
Admitting: RADIOLOGY
Payer: MEDICARE

## 2024-09-03 DIAGNOSIS — C34.91 PRIMARY LUNG CANCER WITH METASTASIS FROM LUNG TO OTHER SITE, RIGHT: ICD-10-CM

## 2024-09-03 LAB — CREAT BLDA-MCNC: 0.9 MG/DL (ref 0.6–1.3)

## 2024-09-03 PROCEDURE — 25510000001 IOPAMIDOL 61 % SOLUTION: Performed by: RADIOLOGY

## 2024-09-03 PROCEDURE — 82565 ASSAY OF CREATININE: CPT

## 2024-09-03 PROCEDURE — 71270 CT THORAX DX C-/C+: CPT

## 2024-09-03 RX ORDER — IOPAMIDOL 612 MG/ML
100 INJECTION, SOLUTION INTRAVASCULAR
Status: COMPLETED | OUTPATIENT
Start: 2024-09-03 | End: 2024-09-03

## 2024-09-03 RX ADMIN — IOPAMIDOL 80 ML: 612 INJECTION, SOLUTION INTRAVENOUS at 10:11

## 2024-09-13 ENCOUNTER — OFFICE VISIT (OUTPATIENT)
Dept: RADIATION ONCOLOGY | Facility: HOSPITAL | Age: 79
End: 2024-09-13
Payer: MEDICARE

## 2024-09-13 VITALS
DIASTOLIC BLOOD PRESSURE: 67 MMHG | RESPIRATION RATE: 18 BRPM | TEMPERATURE: 97.8 F | HEART RATE: 80 BPM | SYSTOLIC BLOOD PRESSURE: 130 MMHG | OXYGEN SATURATION: 96 %

## 2024-09-13 DIAGNOSIS — C34.91 PRIMARY LUNG CANCER WITH METASTASIS FROM LUNG TO OTHER SITE, RIGHT: Primary | ICD-10-CM

## 2024-09-13 PROCEDURE — G0463 HOSPITAL OUTPT CLINIC VISIT: HCPCS | Performed by: RADIOLOGY

## 2024-09-13 NOTE — PROGRESS NOTES
Established Patient Visit      Patient: America Avalos   YOB: 1945   Medical Record Number: [id number]   Date of Visit: September 13, 2024   Primary Diagnosis:  Cancer Staging   Stage IA2 (cT1b, cN0, cM0) Primary lung cancer with metastasis from lung to other site, right [C34.91]  ICD 10 Code:                                             History of Present Illness: Mrs. America Caal is an 78-year-old lady with biopsy-proven moderately differentiated adenocarcinoma in a spiculated 15 mm nodule in the superior segment of the right lower lobe.  Imaging studies have not revealed any findings worrisome for regional lymphatic or distant metastases.  Mrs. Caal completed a stereotactic body radiotherapy (SBRT) to the right lung nodule on 12/20/2023.  The patient received 5000 cGy in 1000 cGy treatment fractions delivered on an alternate day basis.      She was seen by Dr. Carrillo in follow-up on 6/17/2024.  She had a follow-up CT scan of the chest on 9/3/2024.  A stable right lower lobe tiny pulmonary nodule was identified.  No new lesions were seen.  No adenopathy was seen.    She returns today as an established patient for an office visit.  It has been 9 months since she completed her course of SBRT.She has no side effects attributable to SBRT. The patient denies shortness of breath, dyspnea on exertion, productive cough, hemoptysis, hoarseness, swallowing difficulties, chest pain, and recent nonintentional weight loss.        (Old medical records were requested, reviewed, and summarized in the HPI)    Review of Systems       All other systems reviewed and are negative.      Past Medical History:   Diagnosis Date    Depression     GERD (gastroesophageal reflux disease)     Hyperlipidemia     Nausea and vomiting 09/18/2023    Primary lung cancer, RLL 11/10/2023        Past Surgical History:   Procedure Laterality Date    BRONCHOSCOPY WITH ION ROBOTIC ASSIST N/A 11/7/2023    Procedure: BRONCHOSCOPY  WITH ION ROBOT;  Surgeon: Dandre Carrillo MD;  [facility] OR;  Service: Robotics - Pulmonary;  Laterality: N/A;    COLONOSCOPY N/A 9/21/2023    Procedure: COLONOSCOPY FOR SCREENING;  Surgeon: Tatyana Waters MD;  Location:  [facility] OR;  Service: Gastroenterology;  Laterality: N/A;    ENDOSCOPY N/A 9/21/2023    Procedure: ESOPHAGOGASTRODUODENOSCOPY WITH BIOPSY;  Surgeon: Tatyana Waters MD;  Location:  [facility] OR;  Service: Gastroenterology;  Laterality: N/A;      Family History   Problem Relation Age of Onset    No Known Problems Mother     No Known Problems Father         Social History     Socioeconomic History    Marital status:    Tobacco Use    Smoking status: Every Day     Current packs/day: 0.50     Average packs/day: 0.5 packs/day for 30.0 years (15.0 ttl pk-yrs)     Types: Cigarettes     Passive exposure: Current    Smokeless tobacco: Never    Tobacco comments:     Pt is down to 1-2 cigarettes daily   Vaping Use    Vaping status: Never Used   Substance and Sexual Activity    Alcohol use: Never    Drug use: Never    Sexual activity: Defer         Allergies:  Patient has no known allergies.   Prior to Admission medications    Medication Sig Start Date End Date Taking? Authorizing Provider   atorvastatin (LIPITOR) 40 MG tablet Take 1 tablet by mouth Daily. 7/28/23  Yes Ravinder Rodney MD   Cyanocobalamin 1000 MCG/ML kit Inject  as directed.   Yes Ravinder Rodney MD   ferrous sulfate 325 (65 FE) MG tablet Take 1 tablet by mouth Daily. 9/21/23  Yes Radha Peng APRN   miSOPROStol (Cytotec) 100 MCG tablet Take 1 tablet by mouth 4 (Four) Times a Day. 9/21/23  Yes Tatyana Waters MD   ondansetron ODT (ZOFRAN-ODT) 4 MG disintegrating tablet Place 1 tablet on the tongue Every 6 (Six) Hours As Needed for Nausea. 9/17/23  Yes Ambrose Waddell PA   pantoprazole (PROTONIX) 40 MG EC tablet Take 1 tablet by mouth Daily. 9/18/23  Yes iGnette  BUSHRA Betancur   sucralfate (Carafate) 1 GM/10ML suspension Take 10 mL by mouth 4 (Four) Times a Day. 9/18/23  Yes Radha Peng APRN   venlafaxine XR (EFFEXOR-XR) 150 MG 24 hr capsule Take 1 capsule by mouth Daily. 8/2/23  Yes Provider, MD Ravinder      Pain:(on a scale of 0-10)    Pain Score    09/13/24 0911   PainSc: 0-No pain        America Avalos reports a pain score of 0.    Quality of Life:   KPS: 100 - Full Activity  ECOG: (0) Fully active, able to carry on all predisease performance without restriction        Physical Examination:  Vitals:  VITALS@    Height:    Weight:   There is no height or weight on file to calculate BMI.    Physical Exam  Constitutional: The patient is a well-developed, well-nourished 78-year-old female in no acute distress.  Alert and oriented ×3.  HEENT: No icterus.  EOMI.  Atraumatic. Normocephalic. No abnormalities noted.  Lymphatics: No cervical, supraclavicular, or axillary lymphadenopathy is palpated.  CV: Regular rate and rhythm.  No murmurs, rubs, or gallops are appreciated.  Respiratory: Lungs clear to auscultation.  Breath sounds equal bilaterally.  Some rhonchi.  Sounds consistent with mild COPD.  GI: Abdomen soft, nontender, nondistended, with no hepatosplenomegaly or masses palpated.  Extremities: No clubbing, cyanosis, or edema.  Skin: Free of ecchymoses and/or petechiae.  Neurologic: Cranial nerves II through XII are grossly intact, with no focal neurological deficits noted on exam.  Psychiatric: Alert and oriented x3. Normal affect, with no anxiety or depression noted.    Radiographs : CT scan 9/3/2024 as above  Pathology: No new pathology  Labs:   Lab Results   Component Value Date    GLUCOSE 104 (H) 11/06/2023    BUN 20 11/06/2023    CREATININE 0.90 09/03/2024     11/06/2023    K 4.5 11/06/2023     11/06/2023    CALCIUM 9.7 11/06/2023    PROTEINTOT 7.4 11/06/2023    ALBUMIN 4.5 11/06/2023    ALT 15 11/06/2023    AST 19 11/06/2023  "   ALKPHOS 116 11/06/2023    BILITOT 0.2 11/06/2023    GLOB 2.9 11/06/2023    AGRATIO 1.6 11/06/2023    BCR 20.6 11/06/2023    ANIONGAP 11.1 11/06/2023    EGFR 59.6 (L) 11/06/2023      WBC   Date Value Ref Range Status   11/06/2023 7.53 3.40 - 10.80 10*3/mm3 Final     Hemoglobin   Date Value Ref Range Status   11/06/2023 12.5 12.0 - 15.9 g/dL Final     Hematocrit   Date Value Ref Range Status   11/06/2023 40.6 34.0 - 46.6 % Final     Platelets   Date Value Ref Range Status   11/06/2023 411 140 - 450 10*3/mm3 Final    No results found for: \"PSA\" No results found for: \"CEA\"         ASSESSMENT/PLAN: Stage IA2 (cT1b, cN0, cM0)Primary lung cancer with metastasis from lung to other site, right [C34.91]    Mrs. Caal completed a stereotactic body radiotherapy (SBRT) to the right lung nodule on 12/20/2023.  The patient received 5000 cGy in 1000 cGy treatment fractions delivered on an alternate day basis.  She tolerated her treatment very well.  Today there is no physical or radiographic evidence of persistent disease.  Have asked the patient to continue follow-up with her multiple doctors and return to see us in 6 months.    Sincerely,      Electronically signed by Bruce Ward MD 9/13/2024  09:20 EDT    "

## 2025-03-17 ENCOUNTER — TELEPHONE (OUTPATIENT)
Dept: RADIATION ONCOLOGY | Facility: HOSPITAL | Age: 80
End: 2025-03-17
Payer: MEDICARE

## 2025-03-17 DIAGNOSIS — C34.91 PRIMARY LUNG CANCER WITH METASTASIS FROM LUNG TO OTHER SITE, RIGHT: Primary | ICD-10-CM

## 2025-03-17 NOTE — TELEPHONE ENCOUNTER
MA received phone call from patient inquiring about CT Chest needing to be completed prior to her follow-up next week. MA looked over MD last office note and there was no order for CT Chest. MA spoke with MD in office today and he stated it would be best to place order for her to complete prior to following up. MA informed patient we would place order and get it scheduled, then we would return her call. Per request of patient, she would like to have it completed in April.    MA spoke [facility] Scheduling and was able to get her scheduled for 4/10/2025 at 1:00 PM at Hospital. No prep or restrictions prior to.     MA returned call to patient to give her appointment date and time. MA also informed her that we have rescheduled her Radiation Oncology follow-up to same day, 4/10/2025, at 2:00 PM and she expressed understanding. She then requested to get her appointments mailed to her so she will have a reminder which has been printed and ready to be mailed. She expressed no questions or concerns at this time.

## 2025-04-10 ENCOUNTER — OFFICE VISIT (OUTPATIENT)
Dept: RADIATION ONCOLOGY | Facility: HOSPITAL | Age: 80
End: 2025-04-10
Payer: MEDICARE

## 2025-04-10 ENCOUNTER — HOSPITAL ENCOUNTER (OUTPATIENT)
Dept: CT IMAGING | Facility: HOSPITAL | Age: 80
Discharge: HOME | End: 2025-04-10
Payer: MEDICARE

## 2025-04-10 VITALS
DIASTOLIC BLOOD PRESSURE: 62 MMHG | SYSTOLIC BLOOD PRESSURE: 139 MMHG | OXYGEN SATURATION: 99 % | TEMPERATURE: 98.2 F | BODY MASS INDEX: 23.36 KG/M2 | RESPIRATION RATE: 18 BRPM | WEIGHT: 140.4 LBS | HEART RATE: 73 BPM

## 2025-04-10 DIAGNOSIS — C34.91 PRIMARY LUNG CANCER, RIGHT: Primary | ICD-10-CM

## 2025-04-10 DIAGNOSIS — C34.91 PRIMARY LUNG CANCER WITH METASTASIS FROM LUNG TO OTHER SITE, RIGHT: ICD-10-CM

## 2025-04-10 PROCEDURE — G0463 HOSPITAL OUTPT CLINIC VISIT: HCPCS | Performed by: RADIOLOGY

## 2025-04-10 PROCEDURE — 71250 CT THORAX DX C-: CPT

## 2025-04-10 NOTE — PROGRESS NOTES
Established Patient Visit      Patient: America Avalos   YOB: 1945   Medical Record Number: [id number]   Date of Visit: April 10, 2025   Primary Diagnosis:  Cancer Staging   Stage IA2 (cT1b, cN0, cM0) Primary lung cancer, right [C34.91]  ICD 10 Code:                                             History of Present Illness:  Mrs. America Caal is an 78-year-old lady with biopsy-proven moderately differentiated adenocarcinoma in a spiculated 15 mm nodule in the superior segment of the right lower lobe.  Imaging studies have not revealed any findings worrisome for regional lymphatic or distant metastases.  Mrs. Caal completed a stereotactic body radiotherapy (SBRT) to the right lung nodule on 12/20/2023.  She was seen by Dr. Carrillo in follow-up on 6/17/2024.  She had a follow-up CT scan of the chest on 9/3/2024.  A stable right lower lobe tiny pulmonary nodule was identified.  No new lesions were seen.  No adenopathy was seen.     The patient received 5000 cGy in 1000 cGy treatment fractions delivered on an alternate day basis.  She returns today as an established patient for an office visit.  It has been almost 16 months since she completed her treatment.  Today she states she is doing quite well.  She denies shortness of breath dyspnea upon exertion cough hemoptysis hoarseness or dysphagia.    The patient had a CT scan of the chest performed today.  It gave the following impressions:  1.  Centrilobular emphysema again noted.  2.  Right lower lobe spiculated pulmonary nodule grossly stable  measuring about 9 mm with some surrounding scarring.  3.  Other smaller subpleural right lower lobe nodules are stable.  4.  The superior most right upper lobe pulmonary nodule again measures 5  mm but the borders appear somewhat more irregular today and attention on  6-month follow-up is recommended.  5.  Precarinal lymph node is stable measuring 1.2 cm.    Review of Systems       All other systems  reviewed and are negative.    Past Medical History:   Diagnosis Date    Depression     GERD (gastroesophageal reflux disease)     Hyperlipidemia     Nausea and vomiting 09/18/2023    Primary lung cancer, RLL 11/10/2023        Past Surgical History:   Procedure Laterality Date    BRONCHOSCOPY WITH ION ROBOTIC ASSIST N/A 11/7/2023    Procedure: BRONCHOSCOPY WITH ION ROBOT;  Surgeon: Dandre Carrillo MD;  Location: [facility];  Service: Robotics - Pulmonary;  Laterality: N/A;    COLONOSCOPY N/A 9/21/2023    Procedure: COLONOSCOPY FOR SCREENING;  Surgeon: Tatyana Waters MD;  [facility];  Service: Gastroenterology;  Laterality: N/A;    ENDOSCOPY N/A 9/21/2023    Procedure: ESOPHAGOGASTRODUODENOSCOPY WITH BIOPSY;  Surgeon: Tatyana Waters MD;  [facility];  Service: Gastroenterology;  Laterality: N/A;      Family History   Problem Relation Age of Onset    No Known Problems Mother     No Known Problems Father         Social History     Socioeconomic History    Marital status:    Tobacco Use    Smoking status: Every Day     Current packs/day: 0.50     Average packs/day: 0.5 packs/day for 30.0 years (15.0 ttl pk-yrs)     Types: Cigarettes     Passive exposure: Current    Smokeless tobacco: Never    Tobacco comments:     Pt is down to 1-2 cigarettes daily   Vaping Use    Vaping status: Never Used   Substance and Sexual Activity    Alcohol use: Never    Drug use: Never    Sexual activity: Defer         Allergies:  Patient has no known allergies.   Prior to Admission medications    Medication Sig Start Date End Date Taking? Authorizing Provider   atorvastatin (LIPITOR) 40 MG tablet Take 1 tablet by mouth Daily. 7/28/23   Ravinder Rodney MD   Cyanocobalamin 1000 MCG/ML kit Inject  as directed.    Ravinder Rodney MD   ferrous sulfate 325 (65 FE) MG tablet Take 1 tablet by mouth Daily. 9/21/23   Radha Peng APRN   miSOPROStol (Cytotec) 100 MCG tablet  Take 1 tablet by mouth 4 (Four) Times a Day. 9/21/23   Tatyana Waters MD   ondansetron ODT (ZOFRAN-ODT) 4 MG disintegrating tablet Place 1 tablet on the tongue Every 6 (Six) Hours As Needed for Nausea. 9/17/23   Ambrose Waddell PA   pantoprazole (PROTONIX) 40 MG EC tablet Take 1 tablet by mouth Daily. 9/18/23   Radha Peng APRN   sucralfate (Carafate) 1 GM/10ML suspension Take 10 mL by mouth 4 (Four) Times a Day. 9/18/23   Radha Peng APRN   venlafaxine XR (EFFEXOR-XR) 150 MG 24 hr capsule Take 1 capsule by mouth Daily. 8/2/23   Provider, MD Ravinder      Pain:(on a scale of 0-10)    There were no vitals filed for this visit.     America Avalos reports a pain score of 0 .  Given her pain assessment as noted, treatment options were discussed and the following options were decided upon as a follow-up plan to address the patient's pain: continuation of current treatment plan for pain.       Quality of Life:   ECOG: (0) Fully active, able to carry on all predisease performance without restriction    Advanced Care Plan: N   Advance Care Planning     Physical Examination:  Vitals:  VITALS@    Height:    Weight: Weight: (not recorded) There is no height or weight on file to calculate BMI.    Physical Exam  Constitutional: The patient is a well-developed, well-nourished 79-year-old female in no acute distress.  Alert and oriented ×3.  HEENT: Atraumatic. Normocephalic. No abnormalities noted.  No icterus.  EOMI.  Lymphatics: No cervical, supraclavicular, or axillary, or inguinal lymphadenopathy is palpated.  CV: Regular rate and rhythm.  No murmurs, rubs, or gallops are appreciated.  Respiratory: Lungs clear to auscultation.  Breath sounds equal bilaterally.  GI: Abdomen soft, nontender, nondistended, with no hepatosplenomegaly or masses palpated.  Extremities: No clubbing, cyanosis, or edema.  Neurologic: Cranial nerves II through XII are grossly intact, with no focal  "neurological deficits noted on exam.  Psychiatric: Alert and oriented x3. Normal affect, with no anxiety or depression noted.    Radiographs : CT as above  Pathology: No new pathology  Labs:   Lab Results   Component Value Date    GLUCOSE 104 (H) 11/06/2023    BUN 20 11/06/2023    CREATININE 0.90 09/03/2024     11/06/2023    K 4.5 11/06/2023     11/06/2023    CALCIUM 9.7 11/06/2023    PROTEINTOT 7.4 11/06/2023    ALBUMIN 4.5 11/06/2023    ALT 15 11/06/2023    AST 19 11/06/2023    ALKPHOS 116 11/06/2023    BILITOT 0.2 11/06/2023    GLOB 2.9 11/06/2023    AGRATIO 1.6 11/06/2023    BCR 20.6 11/06/2023    ANIONGAP 11.1 11/06/2023    EGFR 59.6 (L) 11/06/2023      WBC   Date Value Ref Range Status   09/26/2024 4.7 3.4 - 10.8 x10E3/uL Final     Hemoglobin   Date Value Ref Range Status   09/26/2024 13.1 11.1 - 15.9 g/dL Final     Hematocrit   Date Value Ref Range Status   09/26/2024 41.4 34.0 - 46.6 % Final     Platelets   Date Value Ref Range Status   09/26/2024 328 150 - 450 x10E3/uL Final    No results found for: \"PSA\" No results found for: \"CEA\"         ASSESSMENT/PLAN: Stage IA2 (cT1b, cN0, cM0) Primary lung cancer, right [C34.91].  It has been about a year and a half since she finished her SBRT.  Today she is doing very well.  CT scan shows no active disease.    I have asked the patient to return in 6 months for her next visit.  Thank you    Sincerely,      Electronically signed by Bruce Ward MD 4/10/2025  13:49 EDT    "

## (undated) DEVICE — SINGLE USE SUCTION VALVE MAJ-209: Brand: SINGLE USE SUCTION VALVE (STERILE)

## (undated) DEVICE — SINGLE PORT MANIFOLD: Brand: NEPTUNE 2

## (undated) DEVICE — CATHETER GUIDE

## (undated) DEVICE — SOL IRR NACL 0.9PCT 1000ML

## (undated) DEVICE — VLV SXN BRONCH DISP FOR FLEX ENDOSCOPE

## (undated) DEVICE — SYR SLPTP 20CC

## (undated) DEVICE — FRCP BX RADJAW4 NDL 2.8 240CM LG OG BX40

## (undated) DEVICE — BIOPSY NEEDLE, 21G: Brand: FLEXISION

## (undated) DEVICE — THE BITE BLOCK MAXI, LATEX FREE STRAP IS USED TO PROTECT THE ENDOSCOPE INSERTION TUBE FROM BEING BITTEN BY THE PATIENT.

## (undated) DEVICE — Device: Brand: DEFENDO AIR/WATER/SUCTION AND BIOPSY VALVE

## (undated) DEVICE — BOWL UTIL STRL 32OZ

## (undated) DEVICE — SENSR O2 OXIMAX FNGR A/ 18IN NONSTR

## (undated) DEVICE — VISION PROBE: Brand: ION

## (undated) DEVICE — VISION PROBE ADAPTER AND SUCTION ADAPTER

## (undated) DEVICE — Device: Brand: ION

## (undated) DEVICE — Device

## (undated) DEVICE — TUBING, SUCTION, 1/4" X 20', STRAIGHT: Brand: MEDLINE INDUSTRIES, INC.

## (undated) DEVICE — ENDOGATOR AUXILIARY WATER JET CONNECTOR: Brand: ENDOGATOR

## (undated) DEVICE — SWIVEL CONNECTOR

## (undated) DEVICE — CATHETER: Brand: ION

## (undated) DEVICE — BRUSH CYTO BRONCOSCOPE

## (undated) DEVICE — CONN Y IRR DISP 1P/U

## (undated) DEVICE — TRAP SPECI MUCUS 40CC LF STRL